# Patient Record
Sex: FEMALE | Race: WHITE | Employment: UNEMPLOYED | URBAN - METROPOLITAN AREA
[De-identification: names, ages, dates, MRNs, and addresses within clinical notes are randomized per-mention and may not be internally consistent; named-entity substitution may affect disease eponyms.]

---

## 2019-02-18 ENCOUNTER — MEDICAL CORRESPONDENCE (OUTPATIENT)
Dept: HEALTH INFORMATION MANAGEMENT | Facility: CLINIC | Age: 29
End: 2019-02-18

## 2019-02-18 ENCOUNTER — TRANSFERRED RECORDS (OUTPATIENT)
Dept: HEALTH INFORMATION MANAGEMENT | Facility: CLINIC | Age: 29
End: 2019-02-18

## 2019-02-19 ENCOUNTER — OFFICE VISIT (OUTPATIENT)
Dept: SURGERY | Facility: CLINIC | Age: 29
End: 2019-02-19
Payer: COMMERCIAL

## 2019-02-19 ENCOUNTER — TELEPHONE (OUTPATIENT)
Dept: SURGERY | Facility: CLINIC | Age: 29
End: 2019-02-19

## 2019-02-19 VITALS
TEMPERATURE: 98.1 F | DIASTOLIC BLOOD PRESSURE: 73 MMHG | OXYGEN SATURATION: 98 % | WEIGHT: 236 LBS | BODY MASS INDEX: 44.56 KG/M2 | HEIGHT: 61 IN | HEART RATE: 68 BPM | SYSTOLIC BLOOD PRESSURE: 125 MMHG

## 2019-02-19 DIAGNOSIS — E66.01 OBESITY, CLASS III, BMI 40-49.9 (MORBID OBESITY) (H): Primary | ICD-10-CM

## 2019-02-19 DIAGNOSIS — E66.01 OBESITY, CLASS III, BMI 40-49.9 (MORBID OBESITY) (H): ICD-10-CM

## 2019-02-19 LAB
ALBUMIN SERPL-MCNC: 3.5 G/DL (ref 3.4–5)
ALP SERPL-CCNC: 87 U/L (ref 40–150)
ALT SERPL W P-5'-P-CCNC: 18 U/L (ref 0–50)
ANION GAP SERPL CALCULATED.3IONS-SCNC: 6 MMOL/L (ref 3–14)
AST SERPL W P-5'-P-CCNC: 13 U/L (ref 0–45)
BILIRUB SERPL-MCNC: 0.3 MG/DL (ref 0.2–1.3)
BUN SERPL-MCNC: 13 MG/DL (ref 7–30)
CALCIUM SERPL-MCNC: 8.1 MG/DL (ref 8.5–10.1)
CHLORIDE SERPL-SCNC: 105 MMOL/L (ref 94–109)
CO2 SERPL-SCNC: 27 MMOL/L (ref 20–32)
CREAT SERPL-MCNC: 0.5 MG/DL (ref 0.52–1.04)
DEPRECATED CALCIDIOL+CALCIFEROL SERPL-MC: 20 UG/L (ref 20–75)
ERYTHROCYTE [DISTWIDTH] IN BLOOD BY AUTOMATED COUNT: 15.2 % (ref 10–15)
GFR SERPL CREATININE-BSD FRML MDRD: >90 ML/MIN/{1.73_M2}
GLUCOSE SERPL-MCNC: 83 MG/DL (ref 70–99)
HBA1C MFR BLD: 5.4 % (ref 0–5.6)
HCT VFR BLD AUTO: 41.4 % (ref 35–47)
HGB BLD-MCNC: 12.7 G/DL (ref 11.7–15.7)
MCH RBC QN AUTO: 24.2 PG (ref 26.5–33)
MCHC RBC AUTO-ENTMCNC: 30.7 G/DL (ref 31.5–36.5)
MCV RBC AUTO: 79 FL (ref 78–100)
PLATELET # BLD AUTO: 369 10E9/L (ref 150–450)
POTASSIUM SERPL-SCNC: 4 MMOL/L (ref 3.4–5.3)
PROT SERPL-MCNC: 7 G/DL (ref 6.8–8.8)
PTH-INTACT SERPL-MCNC: 94 PG/ML (ref 18–80)
RBC # BLD AUTO: 5.24 10E12/L (ref 3.8–5.2)
SODIUM SERPL-SCNC: 138 MMOL/L (ref 133–144)
WBC # BLD AUTO: 8.1 10E9/L (ref 4–11)

## 2019-02-19 RX ORDER — CITALOPRAM HYDROBROMIDE 20 MG/1
20 TABLET ORAL DAILY
COMMUNITY
End: 2019-02-22

## 2019-02-19 RX ORDER — CETIRIZINE HYDROCHLORIDE 10 MG/1
10 TABLET ORAL DAILY
COMMUNITY

## 2019-02-19 SDOH — HEALTH STABILITY: MENTAL HEALTH: HOW OFTEN DO YOU HAVE A DRINK CONTAINING ALCOHOL?: NEVER

## 2019-02-19 ASSESSMENT — PAIN SCALES - GENERAL: PAINLEVEL: NO PAIN (0)

## 2019-02-19 ASSESSMENT — MIFFLIN-ST. JEOR: SCORE: 1744.48

## 2019-02-19 NOTE — LETTER
"2/19/2019       RE: Myra Gordillo  [address]  [place] [address]     Dear Colleague,    Thank you for referring your patient, Myra Gordillo, to the [facility] SURGICAL WEIGHT MANAGEMENT at [facility]. Please see a copy of my visit note below.    New Bariatric Nutrition Consultation Note    Reason For Visit: Nutrition Assessment    Myra Gordillo is a 29 year old presenting today for new bariatric nutrition consult.  Pt is interested in laparoscopic sleeve gastrectomy with Dr. Escoto expected surgery in August 2019.  Patient is accompanied by self.  This is pt's first of 6 required nutrition visits prior to surgery. Pt referred by Anamaria Michael NP (2/19/19).  - Pt is also interested in medical weight management , she will continue with monthly RD visits for the next 6 months.     Support System Reviewed With Patient 2/18/2019   Who can you count on for support throughout your weight loss surgery journey? mother and sister       ANTHROPOMETRICS:  Estimated body mass index is 43.99 kg/m  as calculated from the following:    Height as of an earlier encounter on 2/19/19: 1.56 m (5' 1.42\").    Weight as of an earlier encounter on 2/19/19: 107 kg (236 lb).    Required weight loss goal pre-op: 10 lbs from initial consult weight (goal weight 226 lbs or less before surgery)       2/18/2019   I have tried the following methods to lose weight Watching portions or calories, Exercise, Atkins type diet (low carb/high protein), Physician directed program       Weight Loss Questions Reviewed With Patient 2/18/2019   How long have you been overweight? Since puberty       SUPPLEMENT INFORMATION:  None     NUTRITION HISTORY:  Recall Diet Questions Reviewed With Patient 2/18/2019   Describe what you typically consume for breakfast (typical or most recent): naam with hummus sometimes peaches    Describe what you typically consume for lunch (typical or most recent): cauliflower " rice with chicken and asparagus   Describe what you typically consume for supper (typical or most recent): baked potatoes, kale and salmon   Describe what you typically consume as snacks (typical or most recent): Peaches, frappachinos   How many ounces of water, or other low calorie drinks, do you drink daily (8 oz=1 glass)? 64 oz or more   Please indicate the type of milk: whole   How often do you drink alcohol? Never   Mostly snacking at night sometimes nothing and sometimes 3 snacks  1 once of week or less  No coffee in a week  Fruit milk or keily 350 ml whole.     Eating Habits 2/18/2019   Do you have any dietary restrictions? No   Do you currently binge eat (eat a large amount of food in a short time)? Yes   Are you an emotional eater? Yes   Do you get up to eat after falling asleep? No   What foods do you crave? frapuccinos, cannolis, ice cream       ADDITIONAL INFORMATION:  Pt's mother had bariatric surgery in the past and was successful.   Pt is in a master's program for food science.   Dining Out History Reviewed With Patient 2/18/2019   How often do you dine out? Rarely.   Where do you dine out? (select all that apply) sit-down restaurants   What types of food do you order when you dine out? alanna, seaweed salad       Physical Activity Reviewed With Patient 2/18/2019   How often do you exercise? 1 to 2 times per week   What is the duration of your exercise (in minutes)? 30 Minutes   What types of exercise do you do? walking   What keeps you from being more active?  I should be more active but I just have not gotten around to it, Shortness of breath, Worried people will look at me       NUTRITION DIAGNOSIS:  Obesity r/t long history of self-monitoring deficit and excessive energy intake aeb BMI >30 kg/m2.    INTERVENTION:  Intervention Provided/Education Provided on post-op diet guidelines, vitamins/minerals essential post-operatively, GI anatomy of bariatric surgeries, ways to help prepare for post-op  "diet guidelines pre-operatively, portion/calorie-control, mindful eating, source of protein.  Provided pt with list of goals RD contact information.      Questions Reviewed With Patient 2/18/2019   How ready are you to make changes regarding your weight? Number 1 = Not ready at all to make changes up to 10 = very ready. 10   How confident are you that you can change? 1 = Not confident that you will be successful making changes up to 10 = very confident. 8       Patient Understanding: good  Expected Compliance: good    GOALS:  Relating To Eating:  * Eat slowly (20-30 minutes per meal), chewing foods well (25 chews per bite/applesauce consistency) - stop eating as soon as you feel full  9\" Plate method (1/2 plate non-starchy vegetables/fruit, 1/4 plate lean protein, 1/4 plate whole grain starch - no more than 1/2 cup carb/meal)  Avoid snacking - Limit snacking, try exercising in the evening or distracting yourself. Try protein shake ( do not use whole milk)     Relating to beverages:  Reduce caffeine/carbonation/calorie containing beverages  Separate fluids from meals by 30 minutes before, during, and after eating    Relating to dietary supplements:  Start a multivitamin containing iron daily    Relating to activity:  Increase activity as able - try a home workout 3 times per week.     Relating to cravings:  Anytime you have a craving, find an activity (such as call a family member or friend, drink water, take a walk, read a book, play a game on phone) 15 minutes to see if craving goes away    Follow-Up:   Recommend 1 month follow up visits to assist with lifestyle changes or per insurance.      Time spent with patient: 30 minutes.  Lesley Carranza, MS, RD, LD       Again, thank you for allowing me to participate in the care of your patient.      Sincerely,    Lesley Carranza RD      "

## 2019-02-19 NOTE — LETTER
Date:February 21, 2019      Patient was self referred, no letter generated. Do not send.        [facility] Health Information

## 2019-02-19 NOTE — LETTER
"2019       RE: Myra Gordillo  [address]  [place] [address]     Dear Colleague,    Thank you for referring your patient, Myra Gordillo, to the [facility] Please see a copy of my visit note below.    New Bariatric Surgery Consultation Note    2019    RE: Myra Gordillo  MR#: [id number]  : 1990      Referring provider:       2019   Who referred you? Rose Mary Luna       Chief Complaint/Reason for visit: evaluation for possible weight loss surgery    Dear No primary care provider on file.,    I had the pleasure of seeing your patient, Myra Gordillo, to evaluate her obesity and consider her for possible weight loss surgery. As you know, Myra Gordillo is 29 year old.  She has a height of 5' 1.417\", a weight of 236 lbs 0 oz, and calculated Body mass index is 43.99 kg/m .      HISTORY OF PRESENT ILLNESS:  Weight Loss History Reviewed with Patient 2019   How long have you been overweight? Since puberty   What is the most that you have ever weighed? 237.8   What is the most weight you have lost? 40   I have tried the following methods to lose weight Watching portions or calories, Exercise, Atkins type diet (low carb/high protein), Physician directed program   I have tried the following weight loss medications? (Check all that apply) None   Have you ever had weight loss surgery? No     4 years ago lost from 210 to 175, counting calories and exercise  Low carb in the past  Keto thought about it, but she was concerned about high fat  OTC pills, Fausto  Dieticians  Had signed up with a study at the [facility] but didn't qualify (summer 2018)    CO-MORBIDITIES OF OBESITY INCLUDE:     2019   I have the following co-morbidities associated with obesity: High Cholesterol     High cholesterol not treated  Reflux- more with coffee, very occasional, tums PRN    PAST MEDICAL " HISTORY:  Past Medical History:   Diagnosis Date     Autism spectrum 2004     Degenerative arthritis of knee 2002     Generalized anxiety disorder      Neurofibromatosis, type 1 (H) 1991     Social anxiety disorder      No ongoing care for neurofibromatosis. Denies difficulties with wound healing.     PAST SURGICAL HISTORY:  History reviewed. No pertinent surgical history.    FAMILY HISTORY:   Family History   Problem Relation Age of Onset     Morbid Obesity Mother      Hypertension Father      Sarcoidosis Father      Neurofibromatosis Father      Obesity Father      Asthma Sister      Other - See Comments Sister      Obesity Sister      Mental Illness Maternal Grandmother      Mother had bariatric surgery a year ago and has been successful    SOCIAL HISTORY:   Social History Questions Reviewed With Patient 2/18/2019   Which best describes your employment status (select all that apply) I am a student   Which best describes your marital status: single   Do you have children? No   Who can you count on for support throughout your weight loss surgery journey? mother and sister   Can you afford 3 meals a day?  Yes   Can you afford 50-60 dollars a month for vitamins? 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   Sister in [place], mother in [place]. Family can come here to help with surgery if necessary      HABITS:     2/18/2019   How often do you drink alcohol? Never   Have you ever used any of the following nicotine products? No   Have you or are you currently using street drugs or prescription strength medication for which you do not have a prescription for? No   Do you have a history of chemical dependency (alcohol or drug abuse)? No       PSYCHOLOGICAL HISTORY:   Psychological History Reviewed With Patient 2/18/2019   Have you ever attempted suicide? Never.   Have you had thoughts of suicide in the past year? Yes   Have you ever been hospitalized for mental illness or a suicide attempt?  Never.   Do you have a history of chronic pain? No   Have you ever been diagnosed with fibromyalgia? No   Are you currently being treated for any of the following? (select all that apply) Depression, Anxiety   Are you currently seeing a therapist or counselor?  No   Are you currently seeing a psychiatrist? Yes     Suicidal thoughts NOT this year, many years ago  Feels like depression and anxiety are well controlled with PCP  Has psychiatrist, does not currently see therapist but psychiatrist meets with her monthly     ROS:     2/18/2019   Skin:  None of the above   HEENT: Missing teeth   If you answered yes to missing teeth, please indicate how many: 1   Musculoskeletal: Arthritis   Cardiovascular: Shortness of breath with activity   Pulmonary: Shortness of breath with activity   Gastrointestinal: Heartburn, Constipation   Genitourinary: None of the above   Hematological: None of the above   Neurological: None of the above   Female only: Regular menstrual cycles     Constipation very occasional     EATING BEHAVIORS:     2/18/2019   Have you or anyone else thought that you had an eating disorder? No   Do you currently binge eat (eat a large amount of food in a short time)? Yes   Are you an emotional eater? Yes   Do you get up to eat after falling asleep? No     Struggles with mindless eating and habitual eating, lots of craving, olga sweets  Some difficulty with hunger, especially when restricting calories to lose weight    EXERCISE:     2/18/2019   How often do you exercise? 1 to 2 times per week   What is the duration of your exercise (in minutes)? 30 Minutes   What types of exercise do you do? walking   What keeps you from being more active?  I should be more active but I just have not gotten around to it, Shortness of breath, Worried people will look at me       MEDICATIONS:  Current Outpatient Medications   Medication Sig Dispense Refill     cetirizine (ZYRTEC) 10 MG tablet Take 10 mg by mouth daily        "citalopram (CELEXA) 20 MG tablet Take 20 mg by mouth daily       melatonin 5 MG CAPS Take 1 tablet by mouth daily       Misc Natural Products (GLUCOSAMINE CHONDROITIN ADV PO) Take 1 tablet by mouth daily         ALLERGIES:  No Known Allergies    LABS/IMAGING/MEDICAL RECORDS REVIEW: unavailable    PHYSICAL EXAM:  /73 (BP Location: Left arm, Patient Position: Chair, Cuff Size: Adult Large)   Pulse 68   Temp 98.1  F (36.7  C) (Oral)   Ht 1.56 m (5' 1.42\")   Wt 107 kg (236 lb)   SpO2 98%   BMI 43.99 kg/m     General: No apparent distress  Neuro: A & O x 3  Head: Atraumatic, normocephalicI  Skin: warm and dry, no rashes on exposed skin  Respiratory: respirations unlabored  Abdomen: obese  Extremities: No LE swelling        In summary, Myra Gordillo has Class III obesity with a body mass index of Body mass index is 43.99 kg/m . kg/m2 and the comorbidities stated above. She completed an informational seminar and is a candidate for the laparoscopic gastric sleeve.  She will have to complete the following pre-requisites:    Received weight loss goal of 10 lb prior to surgery.  Achieve clearance from dietitian to see surgeon.  Have preoperative laboratory tests drawn.  Psychological Evaluation with MMPI and clearance for weight loss surgery.  Letter of clearance from the following psychiatry letter or support, primary care letter or support.    Today in the office we discussed gastric sleeve surgery. Preoperative, perioperative, and postoperative processes, management, and follow up were addressed.  Risks and benefits were outlined including the risk of death, staple line leak (1-2%), PE, DVT, ulcer, worsening GERD, N/V, stricture, hernia, wound infection, weight regain, and vitamin deficiencies. I emphasized exercise and activity along with appropriate food choice as the main foundation for weight loss with surgery providing surgical reinforcement of this.  All questions were answered.  A goal sheet and " support group handout were given to the patient.    Once the patient has completed the requirements in their task list and there are no further recommendations, the pt will be allowed to see the surgeon of her choice for consultation on the laparoscopic gastric sleeve surgery. Patient verbalizes understanding of the process to surgery and expectations for the postoperative period including the need for lifelong lifestyle changes, vitamin supplementation, and laboratory monitoring.     Patient is also interested in medical weight management and would like to persue this in addition to the surgical path. She is encouraged to stay on track with the monthly dietician visits. She would like to use medication as well as a tool to reach surgical weight loss goal of 10 lbs. Discussed medication options. Patient would like to try Qsymia. If not covered by insurance, would recommend topiramate only given history with anxiety. This is discussed with patient. Information provided.     Sincerely,     ARIAN Pisano CNP spent a total of 60 minutes face to face with the patient during today's office visit. Over 50% of this time was spent counseling the patient and/or coordinating care.    Bariatric Task List  Status:  Is patient a candidate for bariatric surgery?:  patient is a candidate for bariatric surgery -     Cleared to schedule surgeon consult?:    -     Status:    -     Surgeon: Dr. Escoto -     Tentative surgery month/year: Aug 2019 -        Insurance: Insurance:  [facility] -     Cigna: PCP Recommendation and Medical Clearance:    -      Referral:    -     Other:    -        Patient Info: Initial Weight:  236lb -     Date of Initial Weight/Height:  2/19/2019 -     Goal Weight (lbs):  226 -     Required Weight Loss:  10 -     Surgery Type:  sleeve gastrectomy -     Multidisciplinary Meeting:    -        Dietician Visits: Structured weight loss required by insurance?:  structured weight loss required -     Dietician  "Visit 1:  Needed -     Dietician Visit 2:  Needed -     Dietician Visit 3:  Needed -     Dietician Visit 4:  Needed -     Dietician Visit 5:  Needed -     Dietician Visit 6:  Needed -     Dietician Visit additional:    -     Clearance from dietician to see surgeon?:    -     Dietician Notes:    -        Psychological Evaluation: Psych eval:  Needed -     Therapist letter of support:    -     Psychiatrist letter of support:  Needed -        Lab Work: Complete Blood Count:  Needed -     Comprehensive Metabolic Panel:  Needed -     Vitamin D:  Needed -     Hgb A1c:  Needed -     PTH:  Needed -        Testing:    PCP: Establish care with PCP:  Completed -     Follow up with PCP:    -     PCP letter of support:  Needed -        Smoking: Quit tobacco use (3 months smoke free)?:    -     Quit date:    -        Patient Education:  Information Session:  Completed -     Given \"Making your decision\" handout?:  Given \"\"Making your decision\"\" handout? -     Given support group information?:  support group contact information provided -     Attended support group?:    -     Support plan in place?:    -     Research consents signed?:    -        Final Tasks:  Before surgery online class:  Needed -     Before surgery online class website link:  [email or web address]   After surgery online class:  Needed -     After surgery online class website link:  [email or web address]   Nurse visit for weigh-in and information:  Needed -     Pre-assessment clinic visit with anesthesia team for H&P:  Needed -     Final labs (Hgb, plt, T&S, UA):  Needed -        Notes:   -              Again, thank you for allowing me to participate in the care of your patient.      Sincerely,    Anamaria Michael NP      "

## 2019-02-19 NOTE — PATIENT INSTRUCTIONS
"GOALS:  Relating To Eating:  * Eat slowly (20-30 minutes per meal), chewing foods well (25 chews per bite/applesauce consistency) - stop eating as soon as you feel full  9\" Plate method (1/2 plate non-starchy vegetables/fruit, 1/4 plate lean protein, 1/4 plate whole grain starch - no more than 1/2 cup carb/meal)  Avoid snacking - Limit snacking, try exercising in the evening or distracting yourself. Try protein shake ( do not use whole milk)     Relating to beverages:  Reduce caffeine/carbonation/calorie containing beverages  Separate fluids from meals by 30 minutes before, during, and after eating    Relating to dietary supplements:  Start a multivitamin containing iron daily    Relating to activity:  Increase activity as able - try a home workout 3 times per week.     Relating to cravings:  Anytime you have a craving, find an activity (such as call a family member or friend, drink water, take a walk, read a book, play a game on phone) 15 minutes to see if craving goes away      Follow up with RD in one month    Lesley Carranza, MS, RD, LD  If you need to schedule or reschedule with a dietitian please call [phone].  "

## 2019-02-19 NOTE — TELEPHONE ENCOUNTER
"Prior Authorization Retail Medication Request    Medication/Dose: Qsymia  ICD code (if different than what is on RX):  Obesity, Class III, BMI 40-49.9 (morbid obesity) (H) [E66.01]  Previously Tried and Failed:  Watching portions or calories, Exercise, Atkins type diet (low carb/high protein), Physician directed program  Rationale:  Myra Gordillo is 29 year old.  She has a height of 5' 1.417\", a weight of 236 lbs 0 oz, and calculated Body mass index is 43.99 kg/m  and the following co-morbidities: high cholesterol.      Insurance Name:    Insurance ID:        Pharmacy Information (if different than what is on RX)  Name:  [facility] [place], [place] - [address] [place] [address] [id number]  Phone:  [phone]  "

## 2019-02-19 NOTE — LETTER
Date:February 20, 2019      Patient was self referred, no letter generated. Do not send.        [facility] Health Information

## 2019-02-19 NOTE — NURSING NOTE
"(   Chief Complaint   Patient presents with     Consult     NBS    )    ( Weight: 107 kg (236 lb) )  ( Height: 156 cm (5' 1.42\") )  ( BMI (Calculated): 44.08 )  ( Initial Weight: 107 kg (236 lb) )  ( Cumulative weight loss (lbs): 0 )  (   )  (   )  ( Waist Circumference (cm): 127.4 cm )  (   )    ( BP: 125/73 )  (   )  ( Temp: 98.1  F (36.7  C) )  ( Temp src: Oral )  ( Pulse: 68 )  (   )  ( SpO2: 98 % )    ( There is no problem list on file for this patient.   )  (   Current Outpatient Medications   Medication Sig Dispense Refill     cetirizine (ZYRTEC) 10 MG tablet Take 10 mg by mouth daily       citalopram (CELEXA) 20 MG tablet Take 20 mg by mouth daily       Misc Natural Products (GLUCOSAMINE CHONDROITIN ADV PO) Take 1 tablet by mouth daily      )  ( Diabetes Eval:    )    ( Pain Eval:  No Pain (0) )    ( Wound Eval:       )    (   History   Smoking Status     Never Smoker   Smokeless Tobacco     Never Used    )    ( Signed By:  Amanda Cervantes; February 19, 2019; 9:11 AM )    "

## 2019-02-19 NOTE — PROGRESS NOTES
"New Bariatric Surgery Consultation Note    2019    RE: Myra Gordillo  MR#: [id number]  : 1990      Referring provider:       2019   Who referred you? Rose Mary Luna       Chief Complaint/Reason for visit: evaluation for possible weight loss surgery    Dear No primary care provider on file.,    I had the pleasure of seeing your patient, Myra Gordillo, to evaluate her obesity and consider her for possible weight loss surgery. As you know, Myra Gordillo is 29 year old.  She has a height of 5' 1.417\", a weight of 236 lbs 0 oz, and calculated Body mass index is 43.99 kg/m .      HISTORY OF PRESENT ILLNESS:  Weight Loss History Reviewed with Patient 2019   How long have you been overweight? Since puberty   What is the most that you have ever weighed? 237.8   What is the most weight you have lost? 40   I have tried the following methods to lose weight Watching portions or calories, Exercise, Atkins type diet (low carb/high protein), Physician directed program   I have tried the following weight loss medications? (Check all that apply) None   Have you ever had weight loss surgery? No     4 years ago lost from 210 to 175, counting calories and exercise  Low carb in the past  Keto thought about it, but she was concerned about high fat  OTC pills, Fausto  Dieticians  Had signed up with a study at the [facility] but didn't qualify (summer 2018)    CO-MORBIDITIES OF OBESITY INCLUDE:     2019   I have the following co-morbidities associated with obesity: High Cholesterol     High cholesterol not treated  Reflux- more with coffee, very occasional, tums PRN    PAST MEDICAL HISTORY:  Past Medical History:   Diagnosis Date     Autism spectrum 2004     Degenerative arthritis of knee      Generalized anxiety disorder      Neurofibromatosis, type 1 (H) 1991     Social anxiety disorder      No ongoing care for neurofibromatosis. Denies difficulties with wound healing.     PAST " SURGICAL HISTORY:  History reviewed. No pertinent surgical history.    FAMILY HISTORY:   Family History   Problem Relation Age of Onset     Morbid Obesity Mother      Hypertension Father      Sarcoidosis Father      Neurofibromatosis Father      Obesity Father      Asthma Sister      Other - See Comments Sister      Obesity Sister      Mental Illness Maternal Grandmother      Mother had bariatric surgery a year ago and has been successful    SOCIAL HISTORY:   Social History Questions Reviewed With Patient 2/18/2019   Which best describes your employment status (select all that apply) I am a student   Which best describes your marital status: single   Do you have children? No   Who can you count on for support throughout your weight loss surgery journey? mother and sister   Can you afford 3 meals a day?  Yes   Can you afford 50-60 dollars a month for vitamins? 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   Sister in [place], mother in [place]. Family can come here to help with surgery if necessary      HABITS:     2/18/2019   How often do you drink alcohol? Never   Have you ever used any of the following nicotine products? No   Have you or are you currently using street drugs or prescription strength medication for which you do not have a prescription for? No   Do you have a history of chemical dependency (alcohol or drug abuse)? No       PSYCHOLOGICAL HISTORY:   Psychological History Reviewed With Patient 2/18/2019   Have you ever attempted suicide? Never.   Have you had thoughts of suicide in the past year? Yes   Have you ever been hospitalized for mental illness or a suicide attempt? Never.   Do you have a history of chronic pain? No   Have you ever been diagnosed with fibromyalgia? No   Are you currently being treated for any of the following? (select all that apply) Depression, Anxiety   Are you currently seeing a therapist or counselor?  No   Are you currently seeing a psychiatrist?  Yes     Suicidal thoughts NOT this year, many years ago  Feels like depression and anxiety are well controlled with PCP  Has psychiatrist, does not currently see therapist but psychiatrist meets with her monthly     ROS:     2/18/2019   Skin:  None of the above   HEENT: Missing teeth   If you answered yes to missing teeth, please indicate how many: 1   Musculoskeletal: Arthritis   Cardiovascular: Shortness of breath with activity   Pulmonary: Shortness of breath with activity   Gastrointestinal: Heartburn, Constipation   Genitourinary: None of the above   Hematological: None of the above   Neurological: None of the above   Female only: Regular menstrual cycles     Constipation very occasional     EATING BEHAVIORS:     2/18/2019   Have you or anyone else thought that you had an eating disorder? No   Do you currently binge eat (eat a large amount of food in a short time)? Yes   Are you an emotional eater? Yes   Do you get up to eat after falling asleep? No     Struggles with mindless eating and habitual eating, lots of craving, olga sweets  Some difficulty with hunger, especially when restricting calories to lose weight    EXERCISE:     2/18/2019   How often do you exercise? 1 to 2 times per week   What is the duration of your exercise (in minutes)? 30 Minutes   What types of exercise do you do? walking   What keeps you from being more active?  I should be more active but I just have not gotten around to it, Shortness of breath, Worried people will look at me       MEDICATIONS:  Current Outpatient Medications   Medication Sig Dispense Refill     cetirizine (ZYRTEC) 10 MG tablet Take 10 mg by mouth daily       citalopram (CELEXA) 20 MG tablet Take 20 mg by mouth daily       melatonin 5 MG CAPS Take 1 tablet by mouth daily       Misc Natural Products (GLUCOSAMINE CHONDROITIN ADV PO) Take 1 tablet by mouth daily         ALLERGIES:  No Known Allergies    LABS/IMAGING/MEDICAL RECORDS REVIEW: unavailable    PHYSICAL  "EXAM:  /73 (BP Location: Left arm, Patient Position: Chair, Cuff Size: Adult Large)   Pulse 68   Temp 98.1  F (36.7  C) (Oral)   Ht 1.56 m (5' 1.42\")   Wt 107 kg (236 lb)   SpO2 98%   BMI 43.99 kg/m    General: No apparent distress  Neuro: A & O x 3  Head: Atraumatic, normocephalicI  Skin: warm and dry, no rashes on exposed skin  Respiratory: respirations unlabored  Abdomen: obese  Extremities: No LE swelling        In summary, Myra Gordillo has Class III obesity with a body mass index of Body mass index is 43.99 kg/m . kg/m2 and the comorbidities stated above. She completed an informational seminar and is a candidate for the laparoscopic gastric sleeve.  She will have to complete the following pre-requisites:    Received weight loss goal of 10 lb prior to surgery.  Achieve clearance from dietitian to see surgeon.  Have preoperative laboratory tests drawn.  Psychological Evaluation with MMPI and clearance for weight loss surgery.  Letter of clearance from the following psychiatry letter or support, primary care letter or support.    Today in the office we discussed gastric sleeve surgery. Preoperative, perioperative, and postoperative processes, management, and follow up were addressed.  Risks and benefits were outlined including the risk of death, staple line leak (1-2%), PE, DVT, ulcer, worsening GERD, N/V, stricture, hernia, wound infection, weight regain, and vitamin deficiencies. I emphasized exercise and activity along with appropriate food choice as the main foundation for weight loss with surgery providing surgical reinforcement of this.  All questions were answered.  A goal sheet and support group handout were given to the patient.    Once the patient has completed the requirements in their task list and there are no further recommendations, the pt will be allowed to see the surgeon of her choice for consultation on the laparoscopic gastric sleeve surgery. Patient verbalizes understanding " of the process to surgery and expectations for the postoperative period including the need for lifelong lifestyle changes, vitamin supplementation, and laboratory monitoring.     Patient is also interested in medical weight management and would like to persue this in addition to the surgical path. She is encouraged to stay on track with the monthly dietician visits. She would like to use medication as well as a tool to reach surgical weight loss goal of 10 lbs. Discussed medication options. Patient would like to try Qsymia. If not covered by insurance, would recommend topiramate only given history with anxiety. This is discussed with patient. Information provided.     Sincerely,     ARIAN Pisano CNP     I spent a total of 60 minutes face to face with the patient during today's office visit. Over 50% of this time was spent counseling the patient and/or coordinating care.    Bariatric Task List  Status:  Is patient a candidate for bariatric surgery?:  patient is a candidate for bariatric surgery -     Cleared to schedule surgeon consult?:    -     Status:    -     Surgeon: Dr. Escoto -     Tentative surgery month/year: Aug 2019 -        Insurance: Insurance:  [facility] -     Cigna: PCP Recommendation and Medical Clearance:    -     HP Referral:    -     Other:    -        Patient Info: Initial Weight:  236lb -     Date of Initial Weight/Height:  2/19/2019 -     Goal Weight (lbs):  226 -     Required Weight Loss:  10 -     Surgery Type:  sleeve gastrectomy -     Multidisciplinary Meeting:    -        Dietician Visits: Structured weight loss required by insurance?:  structured weight loss required -     Dietician Visit 1:  Needed -     Dietician Visit 2:  Needed -     Dietician Visit 3:  Needed -     Dietician Visit 4:  Needed -     Dietician Visit 5:  Needed -     Dietician Visit 6:  Needed -     Dietician Visit additional:    -     Clearance from dietician to see surgeon?:    -     Dietician Notes:    -       "  Psychological Evaluation: Psych eval:  Needed -     Therapist letter of support:    -     Psychiatrist letter of support:  Needed -        Lab Work: Complete Blood Count:  Needed -     Comprehensive Metabolic Panel:  Needed -     Vitamin D:  Needed -     Hgb A1c:  Needed -     PTH:  Needed -        Testing:    PCP: Establish care with PCP:  Completed -     Follow up with PCP:    -     PCP letter of support:  Needed -        Smoking: Quit tobacco use (3 months smoke free)?:    -     Quit date:    -        Patient Education:  Information Session:  Completed -     Given \"Making your decision\" handout?:  Given \"\"Making your decision\"\" handout? -     Given support group information?:  support group contact information provided -     Attended support group?:    -     Support plan in place?:    -     Research consents signed?:    -        Final Tasks:  Before surgery online class:  Needed -     Before surgery online class website link:  [email or web address]   After surgery online class:  Needed -     After surgery online class website link:  [email or web address]   Nurse visit for weigh-in and information:  Needed -     Pre-assessment clinic visit with anesthesia team for H&P:  Needed -     Final labs (Hgb, plt, T&S, UA):  Needed -        Notes:   -          "

## 2019-02-19 NOTE — PROGRESS NOTES
"New Bariatric Nutrition Consultation Note    Reason For Visit: Nutrition Assessment    Myra Gordillo is a 29 year old presenting today for new bariatric nutrition consult.  Pt is interested in laparoscopic sleeve gastrectomy with Dr. Escoto expected surgery in August 2019.  Patient is accompanied by self.  This is pt's first of 6 required nutrition visits prior to surgery. Pt referred by Anamaria Michael NP (2/19/19).  - Pt is also interested in medical weight management , she will continue with monthly RD visits for the next 6 months.     Support System Reviewed With Patient 2/18/2019   Who can you count on for support throughout your weight loss surgery journey? mother and sister       ANTHROPOMETRICS:  Estimated body mass index is 43.99 kg/m  as calculated from the following:    Height as of an earlier encounter on 2/19/19: 1.56 m (5' 1.42\").    Weight as of an earlier encounter on 2/19/19: 107 kg (236 lb).    Required weight loss goal pre-op: 10 lbs from initial consult weight (goal weight 226 lbs or less before surgery)       2/18/2019   I have tried the following methods to lose weight Watching portions or calories, Exercise, Atkins type diet (low carb/high protein), Physician directed program       Weight Loss Questions Reviewed With Patient 2/18/2019   How long have you been overweight? Since puberty       SUPPLEMENT INFORMATION:  None     NUTRITION HISTORY:  Recall Diet Questions Reviewed With Patient 2/18/2019   Describe what you typically consume for breakfast (typical or most recent): naam with hummus sometimes peaches    Describe what you typically consume for lunch (typical or most recent): cauliflower rice with chicken and asparagus   Describe what you typically consume for supper (typical or most recent): baked potatoes, kale and salmon   Describe what you typically consume as snacks (typical or most recent): Peaches, frappachinos   How many ounces of water, or other low calorie drinks, do you drink " daily (8 oz=1 glass)? 64 oz or more   Please indicate the type of milk: whole   How often do you drink alcohol? Never   Mostly snacking at night sometimes nothing and sometimes 3 snacks  1 once of week or less  No coffee in a week  Fruit milk or keily 350 ml whole.     Eating Habits 2/18/2019   Do you have any dietary restrictions? No   Do you currently binge eat (eat a large amount of food in a short time)? Yes   Are you an emotional eater? Yes   Do you get up to eat after falling asleep? No   What foods do you crave? frapuccinos, cannolis, ice cream       ADDITIONAL INFORMATION:  Pt's mother had bariatric surgery in the past and was successful.   Pt is in a master's program for food science.   Dining Out History Reviewed With Patient 2/18/2019   How often do you dine out? Rarely.   Where do you dine out? (select all that apply) sit-down restaurants   What types of food do you order when you dine out? sushi, seaweed salad       Physical Activity Reviewed With Patient 2/18/2019   How often do you exercise? 1 to 2 times per week   What is the duration of your exercise (in minutes)? 30 Minutes   What types of exercise do you do? walking   What keeps you from being more active?  I should be more active but I just have not gotten around to it, Shortness of breath, Worried people will look at me       NUTRITION DIAGNOSIS:  Obesity r/t long history of self-monitoring deficit and excessive energy intake aeb BMI >30 kg/m2.    INTERVENTION:  Intervention Provided/Education Provided on post-op diet guidelines, vitamins/minerals essential post-operatively, GI anatomy of bariatric surgeries, ways to help prepare for post-op diet guidelines pre-operatively, portion/calorie-control, mindful eating, source of protein.  Provided pt with list of goals RD contact information.      Questions Reviewed With Patient 2/18/2019   How ready are you to make changes regarding your weight? Number 1 = Not ready at all to make changes up to 10  "= very ready. 10   How confident are you that you can change? 1 = Not confident that you will be successful making changes up to 10 = very confident. 8       Patient Understanding: good  Expected Compliance: good    GOALS:  Relating To Eating:  * Eat slowly (20-30 minutes per meal), chewing foods well (25 chews per bite/applesauce consistency) - stop eating as soon as you feel full  9\" Plate method (1/2 plate non-starchy vegetables/fruit, 1/4 plate lean protein, 1/4 plate whole grain starch - no more than 1/2 cup carb/meal)  Avoid snacking - Limit snacking, try exercising in the evening or distracting yourself. Try protein shake ( do not use whole milk)     Relating to beverages:  Reduce caffeine/carbonation/calorie containing beverages  Separate fluids from meals by 30 minutes before, during, and after eating    Relating to dietary supplements:  Start a multivitamin containing iron daily    Relating to activity:  Increase activity as able - try a home workout 3 times per week.     Relating to cravings:  Anytime you have a craving, find an activity (such as call a family member or friend, drink water, take a walk, read a book, play a game on phone) 15 minutes to see if craving goes away    Follow-Up:   Recommend 1 month follow up visits to assist with lifestyle changes or per insurance.      Time spent with patient: 30 minutes.  Lesley Carranza, MS, RD, LD     "

## 2019-02-19 NOTE — PATIENT INSTRUCTIONS
It was a pleasure meeting with you today.     Thank you for allowing us the privilege of caring for you. We hope we provided you with the excellent service you deserve.     Please let us know if there is anything else we can do for you so that we can be sure you are leaving completely satisfied with your care experience.      You saw ARIAN Pisano CNP  today.     Instructions per today's visit:   Labs today  Start Qsymia  Schedule psych consult  Follow up with primary care and psychiatrist for letters of support  Dietician today and monthly until surgery      Medications started today: Qsymia      To schedule appointments with our team, please call [phone] option #1    Please call during clinic hours Monday through Friday 8:00a - 4:00p if you have questions or you can contact us via Snagsta at anytime.      Nurses: [phone]  Fax: [phone]  Surgery Scheduler: [phone]    Please call the hospital at [phone] to speak with our on call MDs if you have urgent needs after hours, during weekends, or holidays.    **Please note if you need to go to the Emergency Room for any reason in the first 90 days after surgery it is important to go to the [facility] ER on the [place] on [facility] off of the [address] exit off [address]    *For patients who are currently enrolled in our program and have not yet had weight loss surgery please note*:    You must be at your required goal weight at the time of your Anesthesia clinic visit as well as the day of surgery or your surgery will be canceled. You will not be able to obtain a new surgery date until you have met your goal weight.    Lab results will be communicated through My Chart or letter (if My Chart not used). Please call the clinic if you have not received communication after 1 week or if you have any questions.?      Weight loss medications before and after surgery protocol:    Qsymia (phentermine/topiramate): Hold morning of surgery. Restart  after surgery post op day #1    Main follow-up parameters for all bariatric patients     Patients who have undergone Bariatric surgery:    1. Follow-up interval during the first year: ~1 week, 1 month, 3 month, 6 month,12 months.  Every 6 months until 5 years; and then annually thereafter.  The goal of follow-up sessions is to ensure that food intake behavior (choice of food and eating speed) and exercise/activity level are set appropriately.    2. Yearly lab tests ordered by our clinic.      3. The bariatric team should be aware and potentially evaluate all adverse gastrointestinal symptoms (dysphagia, abdominal pain, nausea, vomiting, diarrhea, heartburn, reflux, etc), which can be a sign of complication.  The bariatric surgeons perform general surgery procedures in addition to bariatric surgery (laparoscopic cholecystectomy, etc.)    4. Inability to tolerate textured food (chicken, steak, fish, eggs, beans) is NOT normal and may need to be evaluated by endoscopy performed by the bariatric surgeon.                   Bariatric Task List  Status:  Is patient a candidate for bariatric surgery?:  patient is a candidate for bariatric surgery -     Cleared to schedule surgeon consult?:    -     Status:    -     Surgeon: Dr. Escoto -     Tentative surgery month/year: Aug 2019 -        Insurance: Insurance:  [facility] -     Cigna: PCP Recommendation and Medical Clearance:    -      Referral:    -     Other:    -        Patient Info: Initial Weight:  236lb -     Date of Initial Weight/Height:  2/19/2019 -     Goal Weight (lbs):  226 -     Required Weight Loss:  10 -     Surgery Type:  sleeve gastrectomy -     Multidisciplinary Meeting:    -        Dietician Visits: Structured weight loss required by insurance?:  structured weight loss required -     Dietician Visit 1:  Needed -     Dietician Visit 2:  Needed -     Dietician Visit 3:  Needed -     Dietician Visit 4:  Needed -     Dietician Visit 5:  Needed -     Dietician  "Visit 6:  Needed -     Dietician Visit additional:    -     Clearance from dietician to see surgeon?:    -     Dietician Notes:    -        Psychological Evaluation: Psych eval:  Needed -     Therapist letter of support:    -     Psychiatrist letter of support:  Needed -        Lab Work: Complete Blood Count:  Needed -     Comprehensive Metabolic Panel:  Needed -     Vitamin D:  Needed -     Hgb A1c:  Needed -     PTH:  Needed -        Testing:    PCP: Establish care with PCP:  Completed -     Follow up with PCP:    -     PCP letter of support:  Needed -        Smoking: Quit tobacco use (3 months smoke free)?:    -     Quit date:    -        Patient Education:  Information Session:  Completed -     Given \"Making your decision\" handout?:  Given \"\"Making your decision\"\" handout? -     Given support group information?:  support group contact information provided -     Attended support group?:    -     Support plan in place?:    -     Research consents signed?:    -        Final Tasks:  Before surgery online class:  Needed -     Before surgery online class website link:  [email or web address]   After surgery online class:  Needed -     After surgery online class website link:  [email or web address]   Nurse visit for weigh-in and information:  Needed -     Pre-assessment clinic visit with anesthesia team for H&P:  Needed -     Final labs (Hgb, plt, T&S, UA):  Needed -        Notes:   -       MEDICATION STARTED AT THIS APPOINTMENT    We are starting Qsymia. This is a specific obesity medication and is a combination of Phentermine and Topiramate, formulated as a sustained release, taken one time a day. There are a few different doses of the medication. Doses come in 3.75/23 mg (usually skipped), 7.5/46 mg, 11.25/69 mg, and 15/92 mg. Call the nurse at [phone] if you have any questions or concerns. (Do not stop taking it if you don't think it's working. For some people it works even " though they do not feel much different.)    For some of our patients, the pills work right away. They feel and think quite differently about food. Other patients don't feel much of a change but find in fact they have lost weight! Like all weight loss medications, Qsymia works best when you help it work.  This means:    1) Have less tempting high calorie (fattening) food around the house or office    2) Have lower calorie food (fruits, vegetables,low fat meats and dairy) for snacks    3) Eat out only one time or less each week.   4) Eat your meals at a table with the TV or computer off.    Side-effects (generally well tolerated because it is a sustained release medication)    Topiramate:   -Tingling in hands,feet, or face (usually not very troublesome)   -Mental confusion and word finding trouble (about 10% of patients have this.)     -Feeling sleepy or a bit dopey- this goes away very soon after starting.      Phentermine:    -Feelings of racing pulse or rapid heart beat.    -Increased anxiety.   -Some people can get an elevated blood pressure. Because of this we may have  you  come back within a week or so of starting the medication for a blood pressure check.    One of the dangers of topiramate is the possibility of birth defects--if you get pregnant when you are on it, there is the risk that your baby will be born with a cleft lip or palate.  If you are on topiramate and of child bearing age, you need to be on a reliable form of birth control or refrain from sexual intercourse.     It is very rare for insurance to pay for this and it typically costs around $200 per month. Please check out the website [email or web address] and sign up for the Pharmacy savings program to save $75 a month for 12 months if eligible. The medication can also be paid for out of pocket. It may require a prior authorization which could take up to 1-2 weeks.      In order to get refills of this or any medication we prescribe you must be seen in  the medical weight mgmt clinic every 2-4 months. Please have your pharmacy fax a refill request to [phone].

## 2019-02-20 ENCOUNTER — MYC MEDICAL ADVICE (OUTPATIENT)
Dept: ENDOCRINOLOGY | Facility: CLINIC | Age: 29
End: 2019-02-20

## 2019-02-22 RX ORDER — BUPROPION HYDROCHLORIDE 300 MG/1
300 TABLET ORAL EVERY MORNING
COMMUNITY

## 2019-02-22 NOTE — TELEPHONE ENCOUNTER
Central Prior Authorization Team   Phone: [phone]      PA Initiation    Medication: Qsymia-PA initiated  Insurance Company: [employer] - Phone [phone] Fax [phone]  Pharmacy Filling the Rx: [facility] [place], [place] - [address] [place] [address] [id number]  Filling Pharmacy Phone: [phone]  Filling Pharmacy Fax:    Start Date: 2/22/2019

## 2019-02-26 NOTE — TELEPHONE ENCOUNTER
Prior Authorization Approval    Authorization Effective Date:    Authorization Expiration Date:    Medication: Qsymia-PA approved  Approved Dose/Quantity: 2/day  Reference #:     Insurance Company: [employer] - Phone [phone] Fax [phone]  Expected CoPay:       CoPay Card Available:      Foundation Assistance Needed:    Which Pharmacy is filling the prescription (Not needed for infusion/clinic administered): [facility] [address] [id number]  Pharmacy Notified: Yes  Patient Notified: No    Approved at higher copay, no tiering exception allowed.

## 2019-02-28 ENCOUNTER — CARE COORDINATION (OUTPATIENT)
Dept: SURGERY | Facility: CLINIC | Age: 29
End: 2019-02-28

## 2019-02-28 NOTE — PROGRESS NOTES
Tasklist updated.    Bariatric Task List  Status:  Is patient a candidate for bariatric surgery?:  patient is a candidate for bariatric surgery -     Cleared to schedule surgeon consult?:    -     Status:  surgery evaluation in process -     Surgeon: Dr. Escoto -     Tentative surgery month/year: May 2019 -        Insurance: Insurance:  [employer] -      Referral:    -  Needed. To do 5 coaching phone calls with [facility] [employer] at [facility]. Every 10 - 14 days.  [facility] will register you to receive the first call.   Other:    -        Patient Info: Initial Weight:  236lb -     Date of Initial Weight/Height:  2/19/2019 -     Goal Weight (lbs):  226 -     Required Weight Loss:  10 -     Surgery Type:  sleeve gastrectomy -        Dietician Visits: Structured weight loss required by insurance?:  structured weight loss required -     Dietician Visit 1:  Completed - February   Dietician Visit 2:  Needed - March appt   Dietician Visit 3:  Needed - April. Call [facility] at [phone] to schedule same day as visit with surgeon.   Dietician Visit 4:    -     Dietician Visit 5:    -     Dietician Visit 6:    -     Dietician Visit additional:    -  Can meet with the dietician monthly until you are at your goal weight. Call [phone] to schedule.   Clearance from dietician to see surgeon?:    -     Dietician Notes:    -        Psychological Evaluation: Psych eval:  Needed - Call [employer] member services number to get names of [employer] Providers who do the psych evals.   Psychiatrist letter of support:  Needed - Have office fax it to: [phone].      Lab Work: Complete Blood Count:  Completed - 2/19/19   Comprehensive Metabolic Panel:  Completed -     Vitamin D:  Completed -     Hgb A1c:  Completed -     PTH:  Completed -        Consults/ Clearance: Medical Weight Management: Completed - Medication prescribed.      PCP: Establish care with PCP:  Completed -     Follow up with PCP:    -     PCP letter of support:   "Needed - Have it faxed to [phone].      Patient Education:  Information Session:  Completed -     Given \"Making your decision\" handout?:  Given \"\"Making your decision\"\" handout? -     Given support group information?:  support group contact information provided -     Attended support group?:    -     Support plan in place?:    -     Research consents signed?:    -        Final Tasks:  Before surgery online class:  Needed -     Before surgery online class website link:  [email or web address]   After surgery online class:  Needed -     After surgery online class website link:  [email or web address]   Nurse visit for weigh-in and information:  Needed -     Pre-assessment clinic visit with anesthesia team for H&P:  Needed -     Final labs (Hgb, plt, T&S, UA):  Needed -        Notes:   -           "

## 2019-03-09 ENCOUNTER — HEALTH MAINTENANCE LETTER (OUTPATIENT)
Age: 29
End: 2019-03-09

## 2019-03-19 ENCOUNTER — OFFICE VISIT (OUTPATIENT)
Dept: SURGERY | Facility: CLINIC | Age: 29
End: 2019-03-19
Payer: COMMERCIAL

## 2019-03-19 ENCOUNTER — TELEPHONE (OUTPATIENT)
Dept: SURGERY | Facility: CLINIC | Age: 29
End: 2019-03-19

## 2019-03-19 NOTE — PATIENT INSTRUCTIONS
"GOALS:  Relating To Eating:  * Eat slowly (20-30 minutes per meal), chewing foods well (25 chews per bite/applesauce consistency) - stop eating as soon as you feel full  9\" Plate method (1/2 plate non-starchy vegetables/fruit, 1/4 plate lean protein, 1/4 plate whole grain starch - no more than 1/2 cup carb/meal)  Avoid snacking - Limit snacking, try exercising in the evening or distracting yourself. Try protein shake ( do not use whole milk)     Relating to beverages:  Reduce caffeine/carbonation/calorie containing beverages  Separate fluids from meals by 30 minutes before, during, and after eating    Relating to dietary supplements:  Continue a multivitamin containing iron daily  Start vitamin D3 5,000 international unit(s)     Relating to activity:  Increase activity as able - Walk for 15 minutes 2x daily or reach 8,000 steps    Relating to cravings:  Anytime you have a craving, find an activity (such as call a family member or friend, drink water, take a walk, read a book, play a game on phone) 15 minutes to see if craving goes away     *Protein Shake Criteria: no more than 250 Calories, at least 20 grams of protein, and less than 10 grams of sugar.     CONTACT INFORMATION    **Call [facility] at [phone]:   -If questions about insurance and the prior authorization process.   -After meeting the surgeon to get a surgery date, schedule the  appointments with the anesthesia team and the first postop appointments.  - Ask about bariatric evaluation (Dr. Siegel,) can I get clearance from her.   -- If we need our health psychologist schedule with Abby Jordan or Tiana Nichols    - When you have your next appointment with your Psychiatrist ask for a letter of support. She can fax the letter of support to [phone].     - Ask Dr. Luna for a letter of support for bariatric surgery. She can fax the letter of support to [phone].    Follow up with RD in one month    Lesley Carranza, MS, RD, LD   If you need to " schedule or reschedule with a dietitian please call [phone].

## 2019-03-19 NOTE — TELEPHONE ENCOUNTER
Myra called me to get a surgery date and an appointment with Dr. Escoto. She would like a May date. I made an appointment with Dr. Escoto on April 18 when she was coming in the see Lesley NATH. I asked if she had scheduled her psychological evaluation and she said she wanted to know if her psychiatrist could do that. She will be seeing her next week and I asked her to bring the letter that was in the packet she was given when she came in for her consult. I told her tht normally we would ask for a letter of support from the psychiatrist but they did not normally do the psychological evaluation. She will check into that and call me if her psychiatrist would not do the evaluation, if not, I would have to reschedule her appointment with Dr. Escoto until after the evaluation was received. She wanted a PAC date and OR date now but I told her I could do that after the periop worksheet and PAC order was entered.

## 2019-03-19 NOTE — LETTER
"3/19/2019       RE: Myra Gordillo  [address]  [place] [address]     Dear Colleague,    Thank you for referring your patient, Myra Gordillo, to the [facility] at [facility]. Please see a copy of my visit note below.    Return Bariatric Nutrition Consultation Note    Reason For Visit: Nutrition Assessment    Myra Gordillo is a 29 year old presenting today for follow-up bariatric nutrition consult.  Pt is interested in laparoscopic sleeve gastrectomy with Dr. Escoto expected surgery in August 2019.  Patient is accompanied by self.  This is pt's second of 6 required nutrition visits prior to surgery. Pt referred by Anamaria Michael NP (2/19/19).  - Pt was previously interested in medical weight management and thinking about surgery. She has recently decided to pursue surgery. Her mom has had bariatric surgery in the past and will be coming to [place] for support post-op.      Support System Reviewed With Patient 2/18/2019   Who can you count on for support throughout your weight loss surgery journey? mother and sister       ANTHROPOMETRICS:  Estimated body mass index is 43.99 kg/m  as calculated from the following:    Height as of 2/19/19: 1.56 m (5' 1.42\").    Initial Weight as of 2/19/19: 107 kg (236 lb).    Current Weight: 3/19/19: 224.6 (-11 lb from last month/initial visit)     Required weight loss goal pre-op: 10 lbs from initial consult weight (goal weight 226 lbs or less before surgery)       2/18/2019   I have tried the following methods to lose weight Watching portions or calories, Exercise, Atkins type diet (low carb/high protein), Physician directed program       Weight Loss Questions Reviewed With Patient 2/18/2019   How long have you been overweight? Since puberty       SUPPLEMENT INFORMATION:  Multivitamin - one a day with iron and vitamin D     NUTRITION HISTORY:  Patient has a food journal and she has created 8 " "healthy life style goals that she goes through everyday. She reports that some mornings she has only been having a protein smoothie ( 1/4 c fruit, protein powder, milk or water or Quest protein with milk in  with caffiene free coffee for breakfast). She is snacking less throughout the day, but will still snack at night, up to 3 small snacks in the evening (Outshine popsicle).     Personal Health goals:  No starbucks   8,000 steps a day  No fast food   No coffee  Walk 15 minutes 2x daily    Progress Towards Previous Goals:   Relating To Eating:  * Eat slowly (20-30 minutes per meal), chewing foods well (25 chews per bite/applesauce consistency) - stop eating as soon as you feel full-continue   9\" Plate method (1/2 plate non-starchy vegetables/fruit, 1/4 plate lean protein, 1/4 plate whole grain starch - no more than 1/2 cup carb/meal)-Met  Avoid snacking - Limit snacking, try exercising in the evening or distracting yourself. Try protein shake ( do not use whole milk) Met     Relating to beverages:  Reduce caffeine/carbonation/calorie containing beverages-Met, no coffee in a week   Separate fluids from meals by 30 minutes before, during, and after eating-Moth very with the pills.     Relating to dietary supplements:  Start a multivitamin containing iron daily-Met     Relating to activity:  Increase activity as able - try a home workout 3 times per week. -Met     Relating to cravings:  Anytime you have a craving, find an activity (such as call a family member or friend, drink water, take a walk, read a book, play a game on phone) 15 minutes to see if craving goes away-Improving       ADDITIONAL INFORMATION:  Pt's mother had bariatric surgery in the past and was successful.   Pt is in a master's program for food science.       NUTRITION DIAGNOSIS:  Obesity r/t long history of self-monitoring deficit and excessive energy intake aeb BMI >30 kg/m2.-Continues     INTERVENTION:  Intervention Provided/Education " "Reviewed post-op diet guidelines, vitamins/minerals essential post-operatively, GI anatomy of bariatric surgeries, ways to help prepare for post-op diet guidelines pre-operatively, portion/calorie-control, mindful eating, source of protein.  Provided pt with list of goals RD contact information.  - Discussed/review task list, instructed to call [facility] to schedule with surgeon and RD at the same time.    - Answered all question at this time.      Questions Reviewed With Patient 2/18/2019   How ready are you to make changes regarding your weight? Number 1 = Not ready at all to make changes up to 10 = very ready. 10   How confident are you that you can change? 1 = Not confident that you will be successful making changes up to 10 = very confident. 8       Patient Understanding: good  Expected Compliance: good    GOALS:  Relating To Eating:  * Eat slowly (20-30 minutes per meal), chewing foods well (25 chews per bite/applesauce consistency) - stop eating as soon as you feel full  9\" Plate method (1/2 plate non-starchy vegetables/fruit, 1/4 plate lean protein, 1/4 plate whole grain starch - no more than 1/2 cup carb/meal)  Avoid snacking - Limit snacking, try exercising in the evening or distracting yourself. Try protein shake ( do not use whole milk)     Relating to beverages:  Reduce caffeine/carbonation/calorie containing beverages  Separate fluids from meals by 30 minutes before, during, and after eating    Relating to dietary supplements:  Continue a multivitamin containing iron daily  Start vitamin D3 5,000 international unit(s)     Relating to activity:  Increase activity as able - Walk for 15 minutes 2x daily or reach 8,000 steps    Relating to cravings:  Anytime you have a craving, find an activity (such as call a family member or friend, drink water, take a walk, read a book, play a game on phone) 15 minutes to see if craving goes away     *Protein Shake Criteria: no more than 250 Calories, at least 20 grams of " protein, and less than 10 grams of sugar.      Follow-Up:   Recommend 1 month follow up visits to assist with lifestyle changes or per insurance.    Time spent with patient: 30 minutes.  Lesley Carranza MS, RD, LD     Again, thank you for allowing me to participate in the care of your patient.      Sincerely,    Lesley Carranza RD

## 2019-03-19 NOTE — PROGRESS NOTES
"Return Bariatric Nutrition Consultation Note    Reason For Visit: Nutrition Assessment    Myra Gordillo is a 29 year old presenting today for follow-up bariatric nutrition consult.  Pt is interested in laparoscopic sleeve gastrectomy with Dr. Escoto expected surgery in August 2019.  Patient is accompanied by self.  This is pt's second of 6 required nutrition visits prior to surgery. Pt referred by Anamaria Michael NP (2/19/19).  - Pt was previously interested in medical weight management and thinking about surgery. She has recently decided to pursue surgery. Her mom has had bariatric surgery in the past and will be coming to [place] for support post-op.      Support System Reviewed With Patient 2/18/2019   Who can you count on for support throughout your weight loss surgery journey? mother and sister       ANTHROPOMETRICS:  Estimated body mass index is 43.99 kg/m  as calculated from the following:    Height as of 2/19/19: 1.56 m (5' 1.42\").    Initial Weight as of 2/19/19: 107 kg (236 lb).    Current Weight: 3/19/19: 224.6 (-11 lb from last month/initial visit)     Required weight loss goal pre-op: 10 lbs from initial consult weight (goal weight 226 lbs or less before surgery)       2/18/2019   I have tried the following methods to lose weight Watching portions or calories, Exercise, Atkins type diet (low carb/high protein), Physician directed program       Weight Loss Questions Reviewed With Patient 2/18/2019   How long have you been overweight? Since puberty       SUPPLEMENT INFORMATION:  Multivitamin - one a day with iron and vitamin D     NUTRITION HISTORY:  Patient has a food journal and she has created 8 healthy life style goals that she goes through everyday. She reports that some mornings she has only been having a protein smoothie ( 1/4 c fruit, protein powder, milk or water or Quest protein with milk in  with caffiene free coffee for breakfast). She is snacking less throughout the day, but will " "still snack at night, up to 3 small snacks in the evening (Outshine popsicle).     Personal Health goals:  No starbucks   8,000 steps a day  No fast food   No coffee  Walk 15 minutes 2x daily    Progress Towards Previous Goals:   Relating To Eating:  * Eat slowly (20-30 minutes per meal), chewing foods well (25 chews per bite/applesauce consistency) - stop eating as soon as you feel full-continue   9\" Plate method (1/2 plate non-starchy vegetables/fruit, 1/4 plate lean protein, 1/4 plate whole grain starch - no more than 1/2 cup carb/meal)-Met  Avoid snacking - Limit snacking, try exercising in the evening or distracting yourself. Try protein shake ( do not use whole milk) Met     Relating to beverages:  Reduce caffeine/carbonation/calorie containing beverages-Met, no coffee in a week   Separate fluids from meals by 30 minutes before, during, and after eating-Moth very with the pills.     Relating to dietary supplements:  Start a multivitamin containing iron daily-Met     Relating to activity:  Increase activity as able - try a home workout 3 times per week. -Met     Relating to cravings:  Anytime you have a craving, find an activity (such as call a family member or friend, drink water, take a walk, read a book, play a game on phone) 15 minutes to see if craving goes away-Improving       ADDITIONAL INFORMATION:  Pt's mother had bariatric surgery in the past and was successful.   Pt is in a master's program for food science.       NUTRITION DIAGNOSIS:  Obesity r/t long history of self-monitoring deficit and excessive energy intake aeb BMI >30 kg/m2.-Continues     INTERVENTION:  Intervention Provided/Education Reviewed post-op diet guidelines, vitamins/minerals essential post-operatively, GI anatomy of bariatric surgeries, ways to help prepare for post-op diet guidelines pre-operatively, portion/calorie-control, mindful eating, source of protein.  Provided pt with list of goals RD contact information.  - " "Discussed/review task list, instructed to call [facility] to schedule with surgeon and RD at the same time.    - Answered all question at this time.      Questions Reviewed With Patient 2/18/2019   How ready are you to make changes regarding your weight? Number 1 = Not ready at all to make changes up to 10 = very ready. 10   How confident are you that you can change? 1 = Not confident that you will be successful making changes up to 10 = very confident. 8       Patient Understanding: good  Expected Compliance: good    GOALS:  Relating To Eating:  * Eat slowly (20-30 minutes per meal), chewing foods well (25 chews per bite/applesauce consistency) - stop eating as soon as you feel full  9\" Plate method (1/2 plate non-starchy vegetables/fruit, 1/4 plate lean protein, 1/4 plate whole grain starch - no more than 1/2 cup carb/meal)  Avoid snacking - Limit snacking, try exercising in the evening or distracting yourself. Try protein shake ( do not use whole milk)     Relating to beverages:  Reduce caffeine/carbonation/calorie containing beverages  Separate fluids from meals by 30 minutes before, during, and after eating    Relating to dietary supplements:  Continue a multivitamin containing iron daily  Start vitamin D3 5,000 international unit(s)     Relating to activity:  Increase activity as able - Walk for 15 minutes 2x daily or reach 8,000 steps    Relating to cravings:  Anytime you have a craving, find an activity (such as call a family member or friend, drink water, take a walk, read a book, play a game on phone) 15 minutes to see if craving goes away     *Protein Shake Criteria: no more than 250 Calories, at least 20 grams of protein, and less than 10 grams of sugar.      Follow-Up:   Recommend 1 month follow up visits to assist with lifestyle changes or per insurance.    Time spent with patient: 30 minutes.  Lesley Carranza, MS, RD, LD   "

## 2019-03-19 NOTE — LETTER
Date:March 21, 2019      Patient was self referred, no letter generated. Do not send.        [facility] Health Information

## 2019-03-20 VITALS — BODY MASS INDEX: 41.86 KG/M2 | WEIGHT: 224.6 LBS

## 2019-04-08 ENCOUNTER — TELEPHONE (OUTPATIENT)
Dept: SURGERY | Facility: CLINIC | Age: 29
End: 2019-04-08

## 2019-04-08 ENCOUNTER — MEDICAL CORRESPONDENCE (OUTPATIENT)
Dept: HEALTH INFORMATION MANAGEMENT | Facility: CLINIC | Age: 29
End: 2019-04-08

## 2019-04-08 ENCOUNTER — TRANSFERRED RECORDS (OUTPATIENT)
Dept: HEALTH INFORMATION MANAGEMENT | Facility: CLINIC | Age: 29
End: 2019-04-08

## 2019-04-08 NOTE — TELEPHONE ENCOUNTER
I spoke with Myra to discuss getting her psychological evaluation scheduled. The soonest I could get her in with Dr. Jordan was 6/27 but she wanted something earlier, wants surgery in May. I gave her the phone number for Carmen Liu PsyD, LP. She will call her.

## 2019-04-09 ENCOUNTER — TELEPHONE (OUTPATIENT)
Dept: SURGERY | Facility: CLINIC | Age: 29
End: 2019-04-09

## 2019-04-09 NOTE — TELEPHONE ENCOUNTER
Myra called to let me know she was having her psychological evaluation with Carmen Liu PsyD, LP on 4/18/19. She wanted to discuss an OR date because her mom needed to make reservations to take time off to be with her. I offered her 5/28/19 for the sleeve. She is okay with that date.

## 2019-04-18 ENCOUNTER — OFFICE VISIT (OUTPATIENT)
Dept: SURGERY | Facility: CLINIC | Age: 29
End: 2019-04-18
Payer: COMMERCIAL

## 2019-04-18 ENCOUNTER — TRANSFERRED RECORDS (OUTPATIENT)
Dept: HEALTH INFORMATION MANAGEMENT | Facility: CLINIC | Age: 29
End: 2019-04-18

## 2019-04-18 DIAGNOSIS — E66.813 CLASS 3 SEVERE OBESITY DUE TO EXCESS CALORIES WITH SERIOUS COMORBIDITY AND BODY MASS INDEX (BMI) OF 40.0 TO 44.9 IN ADULT (H): Primary | ICD-10-CM

## 2019-04-18 DIAGNOSIS — E66.01 CLASS 3 SEVERE OBESITY DUE TO EXCESS CALORIES WITH SERIOUS COMORBIDITY AND BODY MASS INDEX (BMI) OF 40.0 TO 44.9 IN ADULT (H): Primary | ICD-10-CM

## 2019-04-18 ASSESSMENT — MIFFLIN-ST. JEOR: SCORE: 1683.7

## 2019-04-18 NOTE — LETTER
Date:April 30, 2019      Patient was self referred, no letter generated. Do not send.        [facility] Health Information

## 2019-04-18 NOTE — LETTER
"4/18/2019       RE: Myra Yang  [address]  [place] [address]     Dear Colleague,    Thank you for referring your patient, Myra Yang, to the [facility] SURGICAL WEIGHT MANAGEMENT at [facility]. Please see a copy of my visit note below.    Return Bariatric Nutrition Consultation Note    Reason For Visit: Nutrition Assessment    Myra Gordillo is a 29 year old presenting today for follow-up bariatric nutrition consult.  Pt is interested in laparoscopic sleeve gastrectomy with Dr. Escoto expected surgery in August 2019.  Patient is accompanied by self.  This is pt's 3rd of 3 required nutrition visits prior to surgery. Pt referred by Anamaria Michael NP (2/19/19).  - Pt was previously interested in medical weight management and thinking about surgery. She has recently decided to pursue surgery. Her mom has had bariatric surgery in the past and will be coming to [place] for support post-op.      Care Coordination :   - 4/18/19: Per care coordinator pt only need 3 RD visit for insurance.     Support System Reviewed With Patient 2/18/2019   Who can you count on for support throughout your weight loss surgery journey? mother and sister       ANTHROPOMETRICS:  Estimated body mass index is 43.99 kg/m  as calculated from the following:    Height as of 2/19/19: 1.56 m (5' 1.42\").    Initial Weight as of 2/19/19: 107 kg (236 lb).    Current Weight: 4/18/19: 222.6 lb (-2 lb from last month, -13 lb from initial visit)     Required weight loss goal pre-op: 10 lbs from initial consult weight (goal weight 226 lbs or less before surgery)       2/18/2019   I have tried the following methods to lose weight Watching portions or calories, Exercise, Atkins type diet (low carb/high protein), Physician directed program       Weight Loss Questions Reviewed With Patient 2/18/2019   How long have you been overweight? Since puberty       SUPPLEMENT " "INFORMATION:  Multivitamin - one a day with iron and vitamin D 5,000 international unit(s)      NUTRITION HISTORY:  Patient continues to use her food journal and she has created 8 healthy life style goals that she goes through everyday. She continues have a protein smoothie in the morning ( 1/4 c fruit, protein powder, milk or water or Quest protein with milk in  with caffeine free coffee for breakfast). She is snacking less throughout the day, but will still snack at night, up to 3 small snacks in the evening (Outshine popsicle).     Personal Health goals:  No starbucks   8,000 steps a day  No fast food   No coffee  Walk 15 minutes 2x daily    Progress Towards Previous Goals:   Relating To Eating:  * Eat slowly (20-30 minutes per meal), chewing foods well (25 chews per bite/applesauce consistency) - stop eating as soon as you feel full-Taking longer for the smoothies, improving still working   9\" Plate method (1/2 plate non-starchy vegetables/fruit, 1/4 plate lean protein, 1/4 plate whole grain starch - no more than 1/2 cup carb/meal)-dinner sometimes is a cashew butter smoothie with hemp seed with some ice, stevia or date syrup sometimes after surgery.   Quinoa with tilapia or Chicken.   Avoid snacking - Limit snacking, try exercising in the evening or distracting yourself. Try protein shake ( do not use whole milk) -Not using whole milk in smoothie.      Relating to beverages:  Reduce caffeine/carbonation/calorie containing beverages-Improving, only two starbucks in the past month from [employer].   Separate fluids from meals by 30 minutes before, during, and after eating-Improving    Relating to dietary supplements:  Continue a multivitamin containing iron daily-Met   Start vitamin D3 5,000 international unit(s) -Met     Relating to activity:  Increase activity as able - Walk for 15 minutes 2x daily or reach 8,000 steps-Met, walking a lot and taking stairs. 6,000- 8,000 pedometer broke, she is no longer " tracking steps.      Relating to cravings:  Anytime you have a craving, find an activity (such as call a family member or friend, drink water, take a walk, read a book, play a game on phone) 15 minutes to see if craving goes away- Did not discuss during this visit.     *Protein Shake Criteria: no more than 250 Calories, at least 20 grams of protein, and less than 10 grams of sugar. - continues       ADDITIONAL INFORMATION:  Pt's mother had bariatric surgery in the past and was successful.   Pt is in a master's program for food science.     NUTRITION DIAGNOSIS:  Obesity r/t long history of self-monitoring deficit and excessive energy intake aeb BMI >30 kg/m2.-Continues   And  Food- and Nutrition-related knowledge deficit r/t lack of prior exposure to information AEB pt unable to verbalize main points of bariatric clear and low-fat full liquid diet    INTERVENTION:  Intervention Provided/Education Provided/Reviewed previous goals and encouraged patient to continue goals prior to surgery.     Provided instruction on bariatric clear and low-fat full liquid diets.  Provided the following handouts: Diet Guidelines for Bariatric Surgery, Sources of Protein, Keeping Track of Your Fluids, list of recommended vitamin/mineral supplementation after SG surgery and RD contact information.  - Discussed protein shakes for after surgery. Recommended smooth protein shakes without seeds or nuts until later on in her diet progression. Current protein smoothies are thick and chunky. Pt familiar with some options as her mom used protein shake after surgery.    - Answered all question at this time.      Questions Reviewed With Patient 2/18/2019   How ready are you to make changes regarding your weight? Number 1 = Not ready at all to make changes up to 10 = very ready. 10   How confident are you that you can change? 1 = Not confident that you will be successful making changes up to 10 = very confident. 8       Patient Understanding:  "good  Expected Compliance: good    GOALS:  Relating To Eating:  * Eat slowly (20-30 minutes per meal), chewing foods well (25 chews per bite/applesauce consistency) - stop eating as soon as you feel full  9\" Plate method (1/2 plate non-starchy vegetables/fruit, 1/4 plate lean protein, 1/4 plate whole grain starch - no more than 1/2 cup carb/meal)  Avoid snacking - Limit snacking, try exercising in the evening or distracting yourself. Try protein shake ( do not use whole milk)     Relating to beverages:  Reduce caffeine/carbonation/calorie containing beverages  Separate fluids from meals by 30 minutes before, during, and after eating    Relating to dietary supplements:  Continue a multivitamin containing iron daily  Start vitamin D3 5,000 international unit(s)     Relating to activity:  Increase activity as able - Walk for 15 minutes 2x daily or reach 8,000 steps    Relating to cravings:  Anytime you have a craving, find an activity (such as call a family member or friend, drink water, take a walk, read a book, play a game on phone) 15 minutes to see if craving goes away     *Protein Shake Criteria: no more than 250 Calories, at least 20 grams of protein, and less than 10 grams of sugar.      Goals after surgery:   1. Follow the bariatric post-op diet advancement schedule:  - Bariatric clear liquid diet through post-op day 1 (while in the hospital).  - Bariatric low-fat full liquid diet on post-op days 2 through 13 (2 weeks).  2. Sip on 48-64 oz (or greater) fluids daily, recording intake to help stay on-track.  - Drink at least 2 oz of fluid every 30 min.    Follow-Up:   Recommend 1 month follow up visits to assist with lifestyle changes or per insurance.    Time spent with patient: 30 minutes.  Lesley Carranza, MS, RD, LD               .      Again, thank you for allowing me to participate in the care of your patient.      Sincerely,    Lesley Carranza RD      "

## 2019-04-18 NOTE — NURSING NOTE
"(   Chief Complaint   Patient presents with     RECHECK     PBS, PLS DO PREOP TEACHING, HOPING FOR MAY OR DATE    )    ( Weight: 101 kg (222 lb 9.6 oz) )  ( Height: 156 cm (5' 1.42\") )  ( BMI (Calculated): 41.49 )  ( Initial Weight: 107 kg (236 lb) )  ( Cumulative weight loss (lbs): 13.4 )  ( Last Visits Weight: 107 kg (236 lb) )  ( Wt change since last visit (lbs): -13.4 )  (   )  (   )    ( BP: 126/84 )  (   )  ( Temp: 98.2  F (36.8  C) )  ( Temp src: Oral )  ( Pulse: 80 )  (   )  ( SpO2: 97 % )    ( There is no problem list on file for this patient.   )  (   Current Outpatient Medications   Medication Sig Dispense Refill     buPROPion (WELLBUTRIN XL) 300 MG 24 hr tablet Take 300 mg by mouth every morning       cetirizine (ZYRTEC) 10 MG tablet Take 10 mg by mouth daily       melatonin 5 MG CAPS Take 1 tablet by mouth daily       Misc Natural Products (GLUCOSAMINE CHONDROITIN ADV PO) Take 1 tablet by mouth daily       Phentermine-Topiramate 7.5-46 MG CP24 Take 1 capsule by mouth daily 30 capsule 3    )  ( Diabetes Eval:    )    ( Pain Eval:  Data Unavailable )    ( Wound Eval:       )    (   History   Smoking Status     Never Smoker   Smokeless Tobacco     Never Used    )    ( Signed By:  Yonny Gomez; April 18, 2019; 8:23 AM )    "

## 2019-04-18 NOTE — LETTER
Date:April 24, 2019      Patient was self referred, no letter generated. Do not send.        [facility] Health Information

## 2019-04-18 NOTE — NURSING NOTE
This patient is having Laparoscopic sleeve gastrectomy by Dr. Escoto.    The following handouts were reviewed with the patient :  Before Your Surgery, Patient Checklist, Weight Loss Surgery Pre-operative Class, Preop Recommendations Quick Reference Guide, History and Physical, Medications to Avoid, Shower or Bathing Before Surgery, Bowel Preparation, Powerful Choices and [place] Advance Health Care Directive.  Questions were addressed and understanding of content was verbalized.  Contact information was provided.    Patient goal weight: 226  Weight today: 222    Call [facility] at [phone] for scheduling.

## 2019-04-18 NOTE — LETTER
"4/18/2019       RE: Myra Yang  [address]  [place] [address]     Dear Colleague,    Thank you for referring your patient, Myra Yang, to the [facility] SURGICAL WEIGHT MANAGEMENT at [facility]. Please see a copy of my visit note below.            2/18/2019   Who referred you? Rose Mary Luna     I was asked to see the patient regarding obesity by the referring provider above.    I had the pleasure of meeting with your patient Myra Yang in our weight loss surgery office.  This patient is a 29 year old female who has been undergoing our thorough preoperative screening process in anticipation of potential bariatric surgery.    At initial evaluation we recorded Myra Gordillo's height of 5' 1.417\", a weight of 236 lbs 0 oz, and calculated  body mass index is 43.99 kg/m .   Class III obesity is associated with high cholesterol.    The patient has been unsuccessful with other methods of permanent weight loss and suffers from multiple weight related medical conditions.  Due to lack of success in achieving weight loss through other methods, she is interested in undergoing bariatric surgery.    PREVIOUS WEIGHT LOSS ATTEMPTS:     2/18/2019   I have tried the following methods to lose weight Watching portions or calories, Exercise, Atkins type diet (low carb/high protein), Physician directed program       CO-MORBIDITIES OF OBESITY INCLUDE:     2/18/2019   I have the following co-morbidities associated with obesity: High Cholesterol       VITALS:  /84   Pulse 80   Temp 98.2  F (36.8  C) (Oral)   Ht 1.56 m (5' 1.42\")   Wt 101 kg (222 lb 9.6 oz)   SpO2 97%   BMI 41.49 kg/m       PE:  GENERAL: Alert and oriented x3. NAD  HEENT exam: Sclerae not icteric. Hearing good. Head normocephalic and atraumatic.   CARDIOVASCULAR: No JVD  RESPIRATORY: Breathing unlabored  GASTROINTESTINAL: Obese  LOWER EXTREMITIES: no " deformities  MUSCULOSKELETAL: Normal gait, Moves all 4 extremities equal and strong  NEUROLOGIC: no gross defect  SKIN: warm and dry to touch     In summary, she has undergone an appropriate medical evaluation, dietitian evaluation, as well as psychologic screening. The patient appears to be an appropriate candidate for bariatric surgery.    In the office today, I discussed the laparoscopic gastric sleeve surgery.  Risks, benefits and anticipated outcomes were outlined including the risk of death, staple line leak (1-2%), PE, DVT, ulcer, worsening GERD, N/V, stricture, hernia, wound infection, weight regain, and vitamin deficiencies. This patient has a good chance of sustaining permanent weight loss due to this procedure.  This should also allow improvement if not resolution of his/her weight related medical conditions.    At present we are going to present your patient's file for prior authorization to insurance.  Pending prior authorization, I anticipate a surgical date in the near future.  We will keep you updated on any progress.  If you have questions regarding care please feel free to contact me.  Total time spent in the clinic was 20 minutes with greater than 50% in face-to-face consultation.        Sincerely,    Baldemar Escoto    Please route or send letter to:  Primary Care Provider (PCP) and Referring Provider    Again, thank you for allowing me to participate in the care of your patient.      Sincerely,    Baldemar Escoto MD

## 2019-04-18 NOTE — PATIENT INSTRUCTIONS
It was a pleasure meeting with you today.     Thank you for allowing us the privilege of caring for you. We hope we provided you with the excellent service you deserve.     Please let us know if there is anything else we can do for you so that we can be sure you are leaving completely satisfied with your care experience.      You saw Dr Escoto today.     Instructions per today's visit: Call [facility] at [phone] for scheduling.      To schedule appointments with our team, please call [phone] option #1    Please call during clinic hours Monday through Friday 8:00a - 4:00p if you have questions or you can contact us via [employer] at anytime.      Nurses: [phone]  Fax: [phone]  Surgery Scheduler: [phone]    Please call the hospital at [phone] to speak with our on call MDs if you have urgent needs after hours, during weekends, or holidays.    Please note if you need to go to the Emergency Room for any reason in the first 90 days after surgery it is important to go to the [facility] ER on the [place] on [address] off of the [address] exit off [address]    *For patients who are currently enrolled in our program and have not yet had weight loss surgery please note*:    You must be at your required goal weight at the time of your Anesthesia clinic visit as well as the day of surgery or your surgery will be canceled. You will not be able to obtain a new surgery date until you have met your goal weight.    Lab results will be communicated through My Chart or letter (if My Chart not used). Please call the clinic if you have not received communication after 1 week or if you have any questions.?      Weight loss medications before and after surgery protocol:    Adipex (phentermine): Stop two days before surgery. Do not restart after surgery. May reconsider restarting as needed in the future.    Topimax (topiramate): Can take right up to surgery including morning of surgery and restart post op day  #1.    Qsymia (phentermine/topiramate): Hold morning of surgery. Restart after surgery post op day #1    Contrave (bupropion/naltrxone): Fast taper before surgery. 1 tablet twice daily for 1 week and then discontinue 4 days before surgery.  Will reconsider restarting at postop appt once no longer taking narcotic pain meds.  Will slowly ramp up again.    Naltrexone: Stop 4 days before surgery.  Will reconsider starting again at postop appts when no longer taking narcotic pain meds.    Belviq (locaserin): Stop 2 days before surgery and restart immediately after surgery    Victoza(liraglutide)/Saxenda(liraglutide 3mg)/Trulicity (dulaglutide) (Any GLP-1): Can take up to surgery date and restart immediately after surgery.    Main follow-up parameters for all bariatric patients     Patients who have undergone Bariatric surgery:    1. Follow-up interval during the first year: ~1 week, 1 month, 3 month, 6 month,12 months.  Every 6 months until 5 years; and then annually thereafter.  The goal of follow-up sessions is to ensure that food intake behavior (choice of food and eating speed) and exercise/activity level are set appropriately.    2. Yearly lab tests ordered by our clinic.      3. The bariatric team should be aware and potentially evaluate all adverse gastrointestinal symptoms (dysphagia, abdominal pain, nausea, vomiting, diarrhea, heartburn, reflux, etc), which can be a sign of complication.  The bariatric surgeons perform general surgery procedures in addition to bariatric surgery (laparoscopic cholecystectomy, etc.)    4. Inability to tolerate textured food (chicken, steak, fish, eggs, beans) is NOT normal and may need to be evaluated by endoscopy performed by the bariatric surgeon.

## 2019-04-18 NOTE — PROGRESS NOTES
"Return Bariatric Nutrition Consultation Note    Reason For Visit: Nutrition Assessment    Myra Gordillo is a 29 year old presenting today for follow-up bariatric nutrition consult.  Pt is interested in laparoscopic sleeve gastrectomy with Dr. Escoto expected surgery in August 2019.  Patient is accompanied by self.  This is pt's 3rd of 3 required nutrition visits prior to surgery. Pt referred by Anamaria Michael NP (2/19/19).  - Pt was previously interested in medical weight management and thinking about surgery. She has recently decided to pursue surgery. Her mom has had bariatric surgery in the past and will be coming to [place] for support post-op.      Care Coordination :   - 4/18/19: Per care coordinator pt only need 3 RD visit for insurance.     Support System Reviewed With Patient 2/18/2019   Who can you count on for support throughout your weight loss surgery journey? mother and sister       ANTHROPOMETRICS:  Estimated body mass index is 43.99 kg/m  as calculated from the following:    Height as of 2/19/19: 1.56 m (5' 1.42\").    Initial Weight as of 2/19/19: 107 kg (236 lb).    Current Weight: 4/18/19: 222.6 lb (-2 lb from last month, -13 lb from initial visit)     Required weight loss goal pre-op: 10 lbs from initial consult weight (goal weight 226 lbs or less before surgery)       2/18/2019   I have tried the following methods to lose weight Watching portions or calories, Exercise, Atkins type diet (low carb/high protein), Physician directed program       Weight Loss Questions Reviewed With Patient 2/18/2019   How long have you been overweight? Since puberty       SUPPLEMENT INFORMATION:  Multivitamin - one a day with iron and vitamin D 5,000 international unit(s)      NUTRITION HISTORY:  Patient continues to use her food journal and she has created 8 healthy life style goals that she goes through everyday. She continues have a protein smoothie in the morning ( 1/4 c fruit, protein powder, milk or water " "or Quest protein with milk in  with caffeine free coffee for breakfast). She is snacking less throughout the day, but will still snack at night, up to 3 small snacks in the evening (Outshine popsicle).     Personal Health goals:  No starbucks   8,000 steps a day  No fast food   No coffee  Walk 15 minutes 2x daily    Progress Towards Previous Goals:   Relating To Eating:  * Eat slowly (20-30 minutes per meal), chewing foods well (25 chews per bite/applesauce consistency) - stop eating as soon as you feel full-Taking longer for the smoothies, improving still working   9\" Plate method (1/2 plate non-starchy vegetables/fruit, 1/4 plate lean protein, 1/4 plate whole grain starch - no more than 1/2 cup carb/meal)-dinner sometimes is a cashew butter smoothie with hemp seed with some ice, stevia or date syrup sometimes after surgery.   Quinoa with tilapia or Chicken.   Avoid snacking - Limit snacking, try exercising in the evening or distracting yourself. Try protein shake ( do not use whole milk) -Not using whole milk in smoothie.      Relating to beverages:  Reduce caffeine/carbonation/calorie containing beverages-Improving, only two starbucks in the past month from [employer].   Separate fluids from meals by 30 minutes before, during, and after eating-Improving    Relating to dietary supplements:  Continue a multivitamin containing iron daily-Met   Start vitamin D3 5,000 international unit(s) -Met     Relating to activity:  Increase activity as able - Walk for 15 minutes 2x daily or reach 8,000 steps-Met, walking a lot and taking stairs. 6,000- 8,000 pedometer broke, she is no longer tracking steps.      Relating to cravings:  Anytime you have a craving, find an activity (such as call a family member or friend, drink water, take a walk, read a book, play a game on phone) 15 minutes to see if craving goes away- Did not discuss during this visit.     *Protein Shake Criteria: no more than 250 Calories, at least 20 " "grams of protein, and less than 10 grams of sugar. - continues       ADDITIONAL INFORMATION:  Pt's mother had bariatric surgery in the past and was successful.   Pt is in a master's program for food science.     NUTRITION DIAGNOSIS:  Obesity r/t long history of self-monitoring deficit and excessive energy intake aeb BMI >30 kg/m2.-Continues   And  Food- and Nutrition-related knowledge deficit r/t lack of prior exposure to information AEB pt unable to verbalize main points of bariatric clear and low-fat full liquid diet    INTERVENTION:  Intervention Provided/Education Provided/Reviewed previous goals and encouraged patient to continue goals prior to surgery.     Provided instruction on bariatric clear and low-fat full liquid diets.  Provided the following handouts: Diet Guidelines for Bariatric Surgery, Sources of Protein, Keeping Track of Your Fluids, list of recommended vitamin/mineral supplementation after SG surgery and RD contact information.  - Discussed protein shakes for after surgery. Recommended smooth protein shakes without seeds or nuts until later on in her diet progression. Current protein smoothies are thick and chunky. Pt familiar with some options as her mom used protein shake after surgery.    - Answered all question at this time.      Questions Reviewed With Patient 2/18/2019   How ready are you to make changes regarding your weight? Number 1 = Not ready at all to make changes up to 10 = very ready. 10   How confident are you that you can change? 1 = Not confident that you will be successful making changes up to 10 = very confident. 8       Patient Understanding: good  Expected Compliance: good    GOALS:  Relating To Eating:  * Eat slowly (20-30 minutes per meal), chewing foods well (25 chews per bite/applesauce consistency) - stop eating as soon as you feel full  9\" Plate method (1/2 plate non-starchy vegetables/fruit, 1/4 plate lean protein, 1/4 plate whole grain starch - no more than 1/2 cup " carb/meal)  Avoid snacking - Limit snacking, try exercising in the evening or distracting yourself. Try protein shake ( do not use whole milk)     Relating to beverages:  Reduce caffeine/carbonation/calorie containing beverages  Separate fluids from meals by 30 minutes before, during, and after eating    Relating to dietary supplements:  Continue a multivitamin containing iron daily  Start vitamin D3 5,000 international unit(s)     Relating to activity:  Increase activity as able - Walk for 15 minutes 2x daily or reach 8,000 steps    Relating to cravings:  Anytime you have a craving, find an activity (such as call a family member or friend, drink water, take a walk, read a book, play a game on phone) 15 minutes to see if craving goes away     *Protein Shake Criteria: no more than 250 Calories, at least 20 grams of protein, and less than 10 grams of sugar.      Goals after surgery:   1. Follow the bariatric post-op diet advancement schedule:  - Bariatric clear liquid diet through post-op day 1 (while in the hospital).  - Bariatric low-fat full liquid diet on post-op days 2 through 13 (2 weeks).  2. Sip on 48-64 oz (or greater) fluids daily, recording intake to help stay on-track.  - Drink at least 2 oz of fluid every 30 min.    Follow-Up:   Recommend 1 month follow up visits to assist with lifestyle changes or per insurance.    Time spent with patient: 30 minutes.  Lesley Carranza, MS, RD, LD               .

## 2019-04-18 NOTE — PATIENT INSTRUCTIONS
"GOALS:  Relating To Eating:  * Eat slowly (20-30 minutes per meal), chewing foods well (25 chews per bite/applesauce consistency) - stop eating as soon as you feel full  9\" Plate method (1/2 plate non-starchy vegetables/fruit, 1/4 plate lean protein, 1/4 plate whole grain starch - no more than 1/2 cup carb/meal)  Avoid snacking - Limit snacking, try exercising in the evening or distracting yourself. Try protein shake ( do not use whole milk)     Relating to beverages:  Reduce caffeine/carbonation/calorie containing beverages  Separate fluids from meals by 30 minutes before, during, and after eating    Relating to dietary supplements:  Continue a multivitamin containing iron daily  Start vitamin D3 5,000 international unit(s)     Relating to activity:  Increase activity as able - Walk for 15 minutes 2x daily or reach 8,000 steps    Relating to cravings:  Anytime you have a craving, find an activity (such as call a family member or friend, drink water, take a walk, read a book, play a game on phone) 15 minutes to see if craving goes away     *Protein Shake Criteria: no more than 250 Calories, at least 20 grams of protein, and less than 10 grams of sugar.      Goals after surgery:   1. Follow the bariatric post-op diet advancement schedule:  - Bariatric clear liquid diet through post-op day 1 (while in the hospital).  - Bariatric low-fat full liquid diet on post-op days 2 through 13 (2 weeks).  2. Sip on 48-64 oz (or greater) fluids daily, recording intake to help stay on-track.  - Drink at least 2 oz of fluid every 30 min.    Call [facility] at [phone] to discuss weight loss surgery date, and related appointments.    Follow up with RD in one week after surgery.     Lesley Carranza, MS, RD, LD  If you need to schedule or reschedule with a dietitian please call [phone].  "

## 2019-04-19 VITALS
WEIGHT: 222.6 LBS | DIASTOLIC BLOOD PRESSURE: 84 MMHG | HEIGHT: 61 IN | HEART RATE: 80 BPM | SYSTOLIC BLOOD PRESSURE: 126 MMHG | OXYGEN SATURATION: 97 % | TEMPERATURE: 98.2 F | BODY MASS INDEX: 42.03 KG/M2

## 2019-04-19 DIAGNOSIS — E66.9 OBESITY: Primary | ICD-10-CM

## 2019-04-22 ENCOUNTER — TELEPHONE (OUTPATIENT)
Dept: SURGERY | Facility: CLINIC | Age: 29
End: 2019-04-22

## 2019-04-22 NOTE — TELEPHONE ENCOUNTER
Spoke with patient as Corrine Mosher, [facility] Pharmacist, states she does not need to see Myra as she is on limited medications and PAC can cover her meds when she comes in 5/13.

## 2019-04-29 NOTE — PROGRESS NOTES
"        2/18/2019   Who referred you? Rose Mary Luna     I was asked to see the patient regarding obesity by the referring provider above.    I had the pleasure of meeting with your patient Myra Yang in our weight loss surgery office.  This patient is a 29 year old female who has been undergoing our thorough preoperative screening process in anticipation of potential bariatric surgery.    At initial evaluation we recorded Myra Gordillo's height of 5' 1.417\", a weight of 236 lbs 0 oz, and calculated body mass index is 43.99 kg/m .  Class III obesity is associated with high cholesterol.    The patient has been unsuccessful with other methods of permanent weight loss and suffers from multiple weight related medical conditions.  Due to lack of success in achieving weight loss through other methods, she is interested in undergoing bariatric surgery.    PREVIOUS WEIGHT LOSS ATTEMPTS:     2/18/2019   I have tried the following methods to lose weight Watching portions or calories, Exercise, Atkins type diet (low carb/high protein), Physician directed program       CO-MORBIDITIES OF OBESITY INCLUDE:     2/18/2019   I have the following co-morbidities associated with obesity: High Cholesterol       VITALS:  /84   Pulse 80   Temp 98.2  F (36.8  C) (Oral)   Ht 1.56 m (5' 1.42\")   Wt 101 kg (222 lb 9.6 oz)   SpO2 97%   BMI 41.49 kg/m      PE:  GENERAL: Alert and oriented x3. NAD  HEENT exam: Sclerae not icteric. Hearing good. Head normocephalic and atraumatic.   CARDIOVASCULAR: No JVD  RESPIRATORY: Breathing unlabored  GASTROINTESTINAL: Obese  LOWER EXTREMITIES: no deformities  MUSCULOSKELETAL: Normal gait, Moves all 4 extremities equal and strong  NEUROLOGIC: no gross defect  SKIN: warm and dry to touch     In summary, she has undergone an appropriate medical evaluation, dietitian evaluation, as well as psychologic screening. The patient appears to be an appropriate candidate for " bariatric surgery.    In the office today, I discussed the laparoscopic gastric sleeve surgery.  Risks, benefits and anticipated outcomes were outlined including the risk of death, staple line leak (1-2%), PE, DVT, ulcer, worsening GERD, N/V, stricture, hernia, wound infection, weight regain, and vitamin deficiencies. This patient has a good chance of sustaining permanent weight loss due to this procedure.  This should also allow improvement if not resolution of his/her weight related medical conditions.    At present we are going to present your patient's file for prior authorization to insurance.  Pending prior authorization, I anticipate a surgical date in the near future.  We will keep you updated on any progress.  If you have questions regarding care please feel free to contact me.  Total time spent in the clinic was 20 minutes with greater than 50% in face-to-face consultation.        Sincerely,    Baldemar Escoto    Please route or send letter to:  Primary Care Provider (PCP) and Referring Provider

## 2019-05-13 ENCOUNTER — OFFICE VISIT (OUTPATIENT)
Dept: SURGERY | Facility: CLINIC | Age: 29
End: 2019-05-13
Payer: COMMERCIAL

## 2019-05-13 ENCOUNTER — CARE COORDINATION (OUTPATIENT)
Dept: SURGERY | Facility: CLINIC | Age: 29
End: 2019-05-13

## 2019-05-13 ENCOUNTER — ANESTHESIA EVENT (OUTPATIENT)
Dept: SURGERY | Facility: CLINIC | Age: 29
End: 2019-05-13

## 2019-05-13 VITALS
SYSTOLIC BLOOD PRESSURE: 134 MMHG | TEMPERATURE: 97.3 F | WEIGHT: 223 LBS | HEART RATE: 84 BPM | OXYGEN SATURATION: 98 % | BODY MASS INDEX: 42.1 KG/M2 | DIASTOLIC BLOOD PRESSURE: 84 MMHG | HEIGHT: 61 IN

## 2019-05-13 DIAGNOSIS — E66.01 MORBID OBESITY (H): ICD-10-CM

## 2019-05-13 DIAGNOSIS — Z01.818 PREOP EXAMINATION: ICD-10-CM

## 2019-05-13 DIAGNOSIS — E66.01 MORBID OBESITY (H): Primary | ICD-10-CM

## 2019-05-13 LAB
ALBUMIN UR-MCNC: NEGATIVE MG/DL
AMORPH CRY #/AREA URNS HPF: ABNORMAL /HPF
APPEARANCE UR: ABNORMAL
BILIRUB UR QL STRIP: NEGATIVE
COLOR UR AUTO: YELLOW
GLUCOSE UR STRIP-MCNC: NEGATIVE MG/DL
HGB UR QL STRIP: NEGATIVE
KETONES UR STRIP-MCNC: NEGATIVE MG/DL
LEUKOCYTE ESTERASE UR QL STRIP: NEGATIVE
MUCOUS THREADS #/AREA URNS LPF: PRESENT /LPF
NITRATE UR QL: NEGATIVE
PH UR STRIP: 7 PH (ref 5–7)
RBC #/AREA URNS AUTO: 1 /HPF (ref 0–2)
SOURCE: ABNORMAL
SP GR UR STRIP: 1.01 (ref 1–1.03)
UROBILINOGEN UR STRIP-MCNC: 0 MG/DL (ref 0–2)
WBC #/AREA URNS AUTO: 0 /HPF (ref 0–5)

## 2019-05-13 RX ORDER — ESCITALOPRAM OXALATE 10 MG/1
10 TABLET ORAL DAILY
COMMUNITY

## 2019-05-13 RX ORDER — CHOLECALCIFEROL (VITAMIN D3) 50 MCG
2000 TABLET ORAL DAILY
COMMUNITY

## 2019-05-13 RX ORDER — MULTIVITAMIN
1 TABLET ORAL DAILY
COMMUNITY

## 2019-05-13 ASSESSMENT — MIFFLIN-ST. JEOR: SCORE: 1685.56

## 2019-05-13 ASSESSMENT — LIFESTYLE VARIABLES: TOBACCO_USE: 0

## 2019-05-13 NOTE — OR NURSING
PAC visit Date 5/13/2019   Patient Goal Weight 226 pounds  Actual Weight 223 pounds   Met goal weight  Yes

## 2019-05-13 NOTE — PROGRESS NOTES
Tasklist updates.    Bariatric Task List  Status:  Is patient a candidate for bariatric surgery?:  patient is a candidate for bariatric surgery -     Cleared to schedule surgeon consult?:  cleared to schedule surgeon consult -     Status:  surgery evaluation in process -     Surgeon: Dr. Escoto -     Tentative surgery month/year: May 2019 -> 5/28/19 -        Insurance: Insurance:  [employer] -     [employer]: PCP Recommendation and Medical Clearance:    -     HP Referral:  Completed - [facility] done 5/9/19   Other:    -        Patient Info: Initial Weight:  236lb -     Date of Initial Weight/Height:  2/19/2019 -     Goal Weight (lbs):  226 -     Required Weight Loss:  10 -     Surgery Type:  sleeve gastrectomy -     Multidisciplinary Meeting:    -        Dietician Visits: Structured weight loss required by insurance?:  structured weight loss required -     Dietician Visit 1:  Completed - February   Dietician Visit 2:  Completed - March appt   Dietician Visit 3:  Completed - April. Call [facility] at [phone] to schedule same day as visit with surgeon.   Dietician Visit 4:    -     Dietician Visit 5:    -     Dietician Visit 6:    -     Dietician Visit additional:    -     Clearance from dietician to see surgeon?:    -     Dietician Notes:    -        Psychological Evaluation: Psych eval:  Completed - 4/18/19 Dr Liu - cleared.   Therapist letter of support:    -     Psychiatrist letter of support:  Completed - 4/8/19 Willow Corbin MD - ltr of support.   Establish care with therapist:    -     Complete eating disorder evaluation:    -     Letter of clearance from therapist/eating disorder program:    -     Other:    -        Lab Work: Complete Blood Count:  Completed - 2/19/19   Comprehensive Metabolic Panel:  Completed -     Vitamin D:  Completed -     Hgb A1c:  Completed -     PTH:  Completed -     H. pylori:    -     TSH:    -     Nicotine Testing:    -     Other:    -        Consults/ Clearance: Sleep  "Medicine:    -     Cardiac:    -     Pain:   -     Dental:    -     Endocrine:    -     Gastroenterology:    -     Vascular Medicine:    -     Hematology:    -     Medical Weight Management: Completed - Medication prescribed.   Physical Therapy/Exercise:    -     Nephrology:    -     Neurology:    -     Pulmonology:    -     Rheumatology:    -     Other    -     Other    -     Other    -           Testing: UGI:    -     EGD:    -     Other:    -        PCP: Establish care with PCP:  Completed -     Follow up with PCP:    -     PCP letter of support:  Completed - 2/19/19 Rose Mary Luna Pa-C referral from [facility] for wt loss.      Smoking: Quit tobacco use (3 months smoke free)?:    -     Quit date:    -        Patient Education:  Information Session:  Completed -     Given \"Making your decision\" handout?:  Given \"\"Making your decision\"\" handout? -     Given support group information?:  support group contact information provided -     Attended support group?:    -     Support plan in place?:    -     Research consents signed?:    -        Additional Surgery Requirements: Review Coag plan:    -     HgA1c <8:    -     Inpatient pain consult:    -     Final nicotine screen:    -     Dental work complete:    -     Birth control plan:    -     Other Requirements:    -     Other Requirements:    -        Final Tasks:  Before surgery online class:  Needed -     Before surgery online class website link:  [email or web address]   After surgery online class:  Needed -     After surgery online class website link:  [email or web address]   Nurse visit for weigh-in and information:  Needed -     Pre-assessment clinic visit with anesthesia team for H&P:  Needed -     Final labs (Hgb, plt, T&S, UA):  Needed -        Notes:   -           "

## 2019-05-13 NOTE — ANESTHESIA PREPROCEDURE EVALUATION
Anesthesia Pre-Procedure Evaluation    Patient: Myra Yang   MRN:     [id number] Gender:   female   Age:    29 year old :      1990        Preoperative Diagnosis: * No surgery found *        Past Medical History:   Diagnosis Date     Autism spectrum 2004     Degenerative arthritis of knee      Generalized anxiety disorder      Neurofibromatosis, type 1 (H) 1991     Social anxiety disorder       No past surgical history on file.       Anesthesia Evaluation     . Pt has had prior anesthetic. Type: MAC    No history of anesthetic complications          ROS/MED HX    ENT/Pulmonary:     (+)RENEA risk factors obese, , . .   (-) tobacco use and recent URI   Neurologic:       Cardiovascular:     (+) Dyslipidemia, ----. : . . . :. . No previous cardiac testing       METS/Exercise Tolerance:  3 - Able to walk 1-2 blocks without stopping   Hematologic:        (-) history of blood clots and History of Transfusion   Musculoskeletal: Comment: Bilateral knees osteoarthritis  (+) arthritis,  -       GI/Hepatic:  - neg GI/hepatic ROS       Renal/Genitourinary:  - ROS Renal section negative       Endo:  - neg endo ROS       Psychiatric: Comment: Autism spectrum disorder    (+) psychiatric history anxiety      Infectious Disease:  - neg infectious disease ROS       Malignancy:      - no malignancy   Other:    (+) No chance of pregnancy no H/O Chronic Pain,no other significant disability                        PHYSICAL EXAM:   Mental Status/Neuro: A/A/O; Age Appropriate   Airway: Facies: Feasible  Mallampati: I  Mouth/Opening: Full  TM distance: > 6 cm  Neck ROM: Full   Respiratory: Auscultation: CTAB     Resp. Rate: Normal     Resp. Effort: Normal      CV: Rhythm: Regular  Rate: Age appropriate  Heart: Normal Sounds   Comments:      Dental:  Dental Comments: Missing lower right posterior tooth                Lab Results   Component Value Date    WBC 8.1 2019    HGB 12.7 2019    HCT 41.4 2019  "    02/19/2019     02/19/2019    POTASSIUM 4.0 02/19/2019    CHLORIDE 105 02/19/2019    CO2 27 02/19/2019    BUN 13 02/19/2019    CR 0.50 (L) 02/19/2019    GLC 83 02/19/2019    GLEN 8.1 (L) 02/19/2019    ALBUMIN 3.5 02/19/2019    PROTTOTAL 7.0 02/19/2019    ALT 18 02/19/2019    AST 13 02/19/2019    ALKPHOS 87 02/19/2019    BILITOTAL 0.3 02/19/2019       Preop Vitals  BP Readings from Last 3 Encounters:   04/19/19 126/84   02/19/19 125/73    Pulse Readings from Last 3 Encounters:   04/19/19 80   02/19/19 68      Resp Readings from Last 3 Encounters:   No data found for Resp    SpO2 Readings from Last 3 Encounters:   04/19/19 97%   02/19/19 98%      Temp Readings from Last 1 Encounters:   04/19/19 98.2  F (36.8  C) (Oral)    Ht Readings from Last 1 Encounters:   04/18/19 1.56 m (5' 1.42\")      Wt Readings from Last 1 Encounters:   04/18/19 101 kg (222 lb 9.6 oz)    Estimated body mass index is 41.49 kg/m  as calculated from the following:    Height as of 4/18/19: 1.56 m (5' 1.42\").    Weight as of 4/18/19: 101 kg (222 lb 9.6 oz).     LDA:            JGONZALESG FV AN PLAN NO PONV RULE         PAC Discussion and Assessment    ASA Classification: 2  Case is suitable for: Viola  Anesthetic techniques and relevant risks discussed: GA  Invasive monitoring and risk discussed: No  Types:   Possibility and Risk of blood transfusion discussed: No  NPO instructions given:   Additional anesthetic preparation and risks discussed:   Needs early admission to pre-op area:   Other:     PAC Resident/NP Anesthesia Assessment:  Myra Yang is a 29 year old scheduled for lap sleeve gastrectomy on 5/28/2019 by Dr. Escoto in treatment of morbid obesity.  PAC referral for risk assessment and optimization for anesthesia with comorbid condition of OA of knees, neurofibromatosis 1, autism spectrum disorder, and anxiety:    Pre-operative considerations:  1.  Cardiac:  Functional status- METS 3. Pt endorses she can walk 30 " minutes and can climb one flight of stairs before feeling fatigued.   Intermediate risk surgery with 0.9% (RCRI #) risk of major adverse cardiac event.   2.  Pulm:  Airway feasible.  RENEA risk: low  3.  GI:  Risk of PONV score = 2.  If > 2, anti-emetic intervention recommended.    VTE risk: 0.5%    #Cardiac - Denies CP, SOB, QUILES, palpitations.  No cardiac history.  Self - reported hyperlipidemia, not on any therapies for this.  #Pulmonary - never smoker.  No recent shortness of breath, cough  #Endo - morbid obesity with current BMI of 41.  Will hold phentermine-topiramate for 7 days prior to DOS  #Musculoskeletal - OA of knees  #Psych - anxiety treated with Wellbutrin and Lexapro, will take DOS  #Other - neurofibromatosis 1. Followed for this in [place].  Denies change in vision, seizure history, SOB. Has many cutaneous fibromas.   #Neuro - Autism spectrum disorder    Patient is optimized and is acceptable candidate for the proposed procedure.  No further diagnostic evaluation is needed.     Patient discussed with Dr. Dumont.     **For further details of assessment, testing, and physical exam please see H and P completed on same date.          Sagrario Rios PA-C, [facility]      Reviewed and Signed by PAC Mid-Level Provider/Resident  Mid-Level Provider/Resident: Sagrario Rios  Date: 5/13/2019  Time:     Attending Anesthesiologist Anesthesia Assessment:        Anesthesiologist:   Date:   Time:   Pass/Fail:   Disposition:     PAC Pharmacist Assessment:        Pharmacist:   Date:   Time:        Sagrario Rios PA-C

## 2019-05-13 NOTE — H&P
Pre-Operative H & P     CC:  Preoperative exam to assess for increased cardiopulmonary risk while undergoing surgery and anesthesia.    Date of Encounter: 5/13/2019  Primary Care Physician:  No primary care provider on file.  Associated Diagnosis:  Morbid obesity    HPI  Myra Yang is a 29 year old female who presents for pre-operative H & P in preparation for laparoscopic sleeve gastrectomy with Dr. Escoto on 5/28/2019 at [facility] under general anesthesia.    Ms. Yang is a 29 year old woman with obesity, hyperlipidemia, OA of knees, autism, Neurofibromatosis 1, and anxiety.  She has struggled with her weight for many years.  She has tried watching portions or calories, exercise, Atkins type diet (low carb/high protein), and a physician directed weight loss program without success.  She was referred to [facility] weight management and the above procedure is planned.  She currently denies any seizure history, recent change in vision, SOB. She is followed in [place] for her NF 1.  She has never had GA but has had MAC without complications (per pt, she had EGD in [place] for GERD evaluation in 2014).      History is obtained from the patient.     Past Medical History  Past Medical History:   Diagnosis Date     Autism spectrum 2004     Degenerative arthritis of knee 2002     Generalized anxiety disorder      Neurofibromatosis, type 1 (H) 1991     Social anxiety disorder        Past Surgical History  History reviewed. No pertinent surgical history.    Hx of Blood transfusions/reactions: none     Hx of abnormal bleeding or anti-platelet use: none    Menstrual history: Patient's last menstrual period was 05/04/2019 (exact date).:     Steroid use in the last year: none    Personal or FH with difficulty with Anesthesia:  none    Prior to Admission Medications  Current Outpatient Medications   Medication Sig Dispense Refill     buPROPion  (WELLBUTRIN XL) 300 MG 24 hr tablet Take 300 mg by mouth every morning       cetirizine (ZYRTEC) 10 MG tablet Take 10 mg by mouth daily       Misc Natural Products (GLUCOSAMINE CHONDROITIN ADV PO) Take 1 tablet by mouth daily       Multiple Vitamin (MULTI-VITAMIN DAILY) TABS Take 1 tablet by mouth daily        Phentermine-Topiramate 7.5-46 MG CP24 Take 1 capsule by mouth daily 30 capsule 3     vitamin D3 (CHOLECALCIFEROL) 2000 units tablet Take 2,000 Units by mouth daily       escitalopram (LEXAPRO) 10 MG tablet Take 10 mg by mouth daily       melatonin 5 MG CAPS Take 1 tablet by mouth daily         Allergies  No Known Allergies    Social History  Social History     Socioeconomic History     Marital status: Single     Spouse name: Not on file     Number of children: Not on file     Years of education: Not on file     Highest education level: Not on file   Occupational History     Not on file   Social Needs     Financial resource strain: Not on file     Food insecurity:     Worry: Not on file     Inability: Not on file     Transportation needs:     Medical: Not on file     Non-medical: Not on file   Tobacco Use     Smoking status: Never Smoker     Smokeless tobacco: Never Used   Substance and Sexual Activity     Alcohol use: No     Frequency: Never     Drug use: Never     Sexual activity: Not on file   Lifestyle     Physical activity:     Days per week: Not on file     Minutes per session: Not on file     Stress: Not on file   Relationships     Social connections:     Talks on phone: Not on file     Gets together: Not on file     Attends Taoism service: Not on file     Active member of club or organization: Not on file     Attends meetings of clubs or organizations: Not on file     Relationship status: Not on file     Intimate partner violence:     Fear of current or ex partner: Not on file     Emotionally abused: Not on file     Physically abused: Not on file     Forced sexual activity: Not on file   Other Topics  "Concern     Not on file   Social History Narrative     Not on file       Family History  Family History   Problem Relation Age of Onset     Morbid Obesity Mother      Hypertension Father      Sarcoidosis Father      Neurofibromatosis Father      Obesity Father      Asthma Sister      Other - See Comments Sister      Obesity Sister      Mental Illness Maternal Grandmother      ROS/MED HX   The complete review of systems is negative other than noted in the HPI or here.  ENT/Pulmonary:     (+)RENEA risk factors obese, , . .   (-) tobacco use and recent URI   Neurologic:       Cardiovascular:     (+) Dyslipidemia, ----. : . . . :. . No previous cardiac testing       METS/Exercise Tolerance:  3 - Able to walk 1-2 blocks without stopping   Hematologic:        (-) history of blood clots and History of Transfusion   Musculoskeletal: Comment: Bilateral knees osteoarthritis  (+) arthritis,  -       GI/Hepatic:  - neg GI/hepatic ROS       Renal/Genitourinary:  - ROS Renal section negative       Endo:  - neg endo ROS       Psychiatric: Comment: Autism spectrum disorder    (+) psychiatric history anxiety      Infectious Disease:  - neg infectious disease ROS       Malignancy:      - no malignancy   Other:    (+) No chance of pregnancy no H/O Chronic Pain,no other significant disability               Temp: 97.3  F (36.3  C)   BP: 134/84 Pulse: 84     SpO2: 98 %         223 lbs 0 oz  5' 1.42\"   Body mass index is 41.56 kg/m .       Physical Exam  Constitutional: Awake, alert, cooperative, no apparent distress, and appears stated age.  Eyes: Pupils equal, round and reactive to light, extra ocular muscles intact, sclera clear, conjunctiva normal.  HENT: Normocephalic, oral pharynx with moist mucus membranes, good dentition. No goiter appreciated.   Respiratory: Clear to auscultation bilaterally, no crackles or wheezing.  Cardiovascular: Regular rate and rhythm, normal S1 and S2, and no murmur noted.  Carotids +2, no bruits. No edema. " Palpable pulses to radial  DP and PT arteries.   GI: Normal bowel sounds, soft, non-distended, non-tender, difficult exam due to obese abdomen  Lymph/Hematologic: No cervical lymphadenopathy and no supraclavicular lymphadenopathy.  Genitourinary:  Deferred  Skin: Warm and dry.  No rashes at anticipated surgical site. Many fibromas on exposed skin noted.  Musculoskeletal: Full ROM of neck. There is no redness, warmth, or swelling of the joints. Gross motor strength is normal.    Neurologic: Awake, alert, oriented to name, place and time. Cranial nerves II-XII are grossly intact. Gait is normal.   Neuropsychiatric: Calm, cooperative. Normal affect.     Labs: (personally reviewed)  Lab Results   Component Value Date    WBC 8.1 02/19/2019     Lab Results   Component Value Date    RBC 5.24 02/19/2019     Lab Results   Component Value Date    HGB 12.7 02/19/2019     Lab Results   Component Value Date    HCT 41.4 02/19/2019     Lab Results   Component Value Date    MCV 79 02/19/2019     Lab Results   Component Value Date    MCH 24.2 02/19/2019     Lab Results   Component Value Date    MCHC 30.7 02/19/2019     Lab Results   Component Value Date    RDW 15.2 02/19/2019     Lab Results   Component Value Date     02/19/2019     Last Comprehensive Metabolic Panel:  Sodium   Date Value Ref Range Status   02/19/2019 138 133 - 144 mmol/L Final     Potassium   Date Value Ref Range Status   02/19/2019 4.0 3.4 - 5.3 mmol/L Final     Chloride   Date Value Ref Range Status   02/19/2019 105 94 - 109 mmol/L Final     Carbon Dioxide   Date Value Ref Range Status   02/19/2019 27 20 - 32 mmol/L Final     Anion Gap   Date Value Ref Range Status   02/19/2019 6 3 - 14 mmol/L Final     Glucose   Date Value Ref Range Status   02/19/2019 83 70 - 99 mg/dL Final     Urea Nitrogen   Date Value Ref Range Status   02/19/2019 13 7 - 30 mg/dL Final     Creatinine   Date Value Ref Range Status   02/19/2019 0.50 (L) 0.52 - 1.04 mg/dL Final     GFR  Estimate   Date Value Ref Range Status   02/19/2019 >90 >60 mL/min/[1.73_m2] Final     Comment:     Non  GFR Calc  Starting 12/18/2018, serum creatinine based estimated GFR (eGFR) will be   calculated using the Chronic Kidney Disease Epidemiology Collaboration   (CKD-EPI) equation.       Calcium   Date Value Ref Range Status   02/19/2019 8.1 (L) 8.5 - 10.1 mg/dL Final     Bilirubin Total   Date Value Ref Range Status   02/19/2019 0.3 0.2 - 1.3 mg/dL Final     Alkaline Phosphatase   Date Value Ref Range Status   02/19/2019 87 40 - 150 U/L Final     ALT   Date Value Ref Range Status   02/19/2019 18 0 - 50 U/L Final     AST   Date Value Ref Range Status   02/19/2019 13 0 - 45 U/L Final         EKG:  not indicated          ASSESSMENT and PLAN  Myra Yang is a 29 year old scheduled for lap sleeve gastrectomy on 5/28/2019 by Dr. Escoto in treatment of morbid obesity.  PAC referral for risk assessment and optimization for anesthesia with comorbid condition of OA of knees, neurofibromatosis 1, autism spectrum disorder, and anxiety:    Pre-operative considerations:  1.  Cardiac:  Functional status- METS 3. Pt endorses she can walk 30 minutes and can climb one flight of stairs before feeling fatigued.   Intermediate risk surgery with 0.9% (RCRI #) risk of major adverse cardiac event.   2.  Pulm:  Airway feasible.  RENEA risk: low  3.  GI:  Risk of PONV score = 2.  If > 2, anti-emetic intervention recommended.    VTE risk: 0.5%    #Cardiac - Denies CP, SOB, QUILES, palpitations.  No cardiac history.  Self - reported hyperlipidemia, not on any therapies for this.  #Pulmonary - never smoker.  No recent shortness of breath, cough  #Endo - morbid obesity with current BMI of 41.  Will hold phentermine-topiramate for 7 days prior to DOS  #Musculoskeletal - OA of knees  #Psych - anxiety treated with Wellbutrin and Lexapro, will take DOS  #Other - neurofibromatosis 1. Followed for this in [place].  Denies  change in vision, seizure history, SOB. Has many cutaneous fibromas.   #Neuro - Autism spectrum disorder, denies seizure history    Patient is optimized and is acceptable candidate for the proposed procedure.  No further diagnostic evaluation is needed.    Patient was discussed with Dr Dumont.    Sagrario Rios PA-C  Preoperative Assessment Center  [facility]  Phone: [phone]  Fax: [phone]

## 2019-05-13 NOTE — PATIENT INSTRUCTIONS
Preparing for Your Surgery      Name:  Myra Yang   MRN:  [id number]   :  1990   Today's Date:  2019     Arriving for surgery:  Surgery date:  19  Arrival time:  10:20 am    Please come to:       [facility] [address]  [address] [place]  [address]    - ? parking is available in front of the hospital      -    Please proceed to [address] on the 3rd floor. [phone]?     - ?If you are in need of directions, wheelchair or escort please stop at the Information Desk in the lobby.  Inform the information person that you are here for surgery; a wheelchair and escort to [address] will be provided.?     -  Bring your ID and insurance card.    What can I eat or drink?  -  Follow Bariatric Clinic instructions regarding clear liquid diet.  -  You may have clear liquids (water, apple juice or Gatorade) until 12 midnight, day of surgery.      -  Sips of water only with medication from 12 midnight until 9:20 am, day of surgery.      -  Nothing after 9:20 am, day of surgery.    Which medicines can I take?       -  Do not bring your own medications to the hospital.        -  Follow Bariatric Clinic instructions regarding Ibuprofen. If no instructions given, NO Ibuprofen the day prior to surgery.         -  Hold Phentermine-Topiramate, Multivitamin and Glucosamine Chondroitin 7 days prior to surgery. Last dose 19.       -  Do NOT take these medications in the morning, the day of surgery:  Vitamin D3,     -  Please take these medications the morning of surgery:  Bupropion, Escitalopram, Cetirizine,      Acetaminophen (Tylenol) if needed    How do I prepare myself?  -  Take two showers: one the night before surgery; and one the morning of surgery.         Use Scrubcare or Hibiclens to wash from neck down.  You may use your own shampoo and conditioner. No other hair products.   -  Do NOT use lotion, powder, colognes, deodorant, or antiperspirant the  day of your surgery.  -  Do NOT wear any makeup, fingernail polish or jewelry.    -  Begin using Incentive Spirometer 1 week prior to surgery.  Use 4 times per day, up to 5-10 breaths each time.  Bring Incentive Spirometer to hospital.    Questions or Concerns:  If you have questions or concerns prior to your surgery, call [phone]. (Mon - Fri   8 am- 5:30 pm)  Questions about surgery, contact your Surgeons office.      AFTER YOUR SURGERY  Breathing exercises   Breathing exercises help you recover faster. Take deep breaths and let the air out slowly. This will:     Help you wake up after surgery.    Help prevent complications like pneumonia.  Preventing complications will help you go home sooner.   Nausea and vomiting   You may feel sick to your stomach after surgery; if so, let your nurse know.    Pain control:  After surgery, you may have pain. Our goal is to help you manage your pain. Pain medicine will help you feel comfortable enough to do activities that will help you heal.  These activities may include breathing exercises, walking and physical therapy.   To help your health care team treat your pain we will ask: 1) If you have pain  2) where it is located 3) describe your pain in your words  Methods of pain control include medications given by mouth, vein or by nerve block for some surgeries.  Sequential Compression Device (SCD) or Pneumo Boots:  You may need to wear SCD S on your legs or feet. These are wraps connected to a machine that pumps in air and releases it. The repeated pumping helps prevent blood clots from forming.   Using an Incentive Spirometer    An incentive spirometer is a device that helps you do deep breathing exercises. These exercises expand your lungs, aid in circulation, and help prevent pneumonia. Deep breathing exercises also help you breathe better and improve the function of your lungs by:    Keeping your lungs clear    Strengthening your breathing muscles    Helping prevent  respiratory complications or problems  The incentive spirometer gives you a way to take an active part in your care. A nurse or therapist will teach you breathing exercises. To do these exercises, you will breathe in through your mouth and not your nose. The incentive spirometer only works correctly if you breathe in through your mouth.    Steps to clear lungs  Step 1. Exhale normally. Then, inhale normally.    Relax and breathe out.  Step 2. Place your lips tightly around the mouthpiece.    Make sure the device is upright and not tilted.  Step 3. Inhale as much air as you can through the mouthpiece (don't breath through your nose).    Inhale slowly and deeply.    Hold your breath long enough to keep the balls or disk raised for at least 3 to 5 seconds, or as instructed by your healthcare provider.  Step 4. Repeat the exercise regularly.

## 2019-05-20 ENCOUNTER — TELEPHONE (OUTPATIENT)
Dept: SURGERY | Facility: CLINIC | Age: 29
End: 2019-05-20

## 2019-05-20 NOTE — TELEPHONE ENCOUNTER
Patient called to verify her surgery date and time. Verified. She also would like a  present as her mother will be with her. She wanted to know if her mother could stay in the room overnight with her, number to bed control given and she will contact that department.

## 2019-05-28 ENCOUNTER — OFFICE VISIT (OUTPATIENT)
Dept: INTERPRETER SERVICES | Facility: CLINIC | Age: 29
End: 2019-05-28
Payer: COMMERCIAL

## 2019-05-28 ENCOUNTER — HOSPITAL ENCOUNTER (INPATIENT)
Facility: CLINIC | Age: 29
LOS: 1 days | Discharge: HOME | DRG: 620 | End: 2019-05-29
Attending: SURGERY | Admitting: SURGERY
Payer: COMMERCIAL

## 2019-05-28 ENCOUNTER — ANESTHESIA (OUTPATIENT)
Dept: SURGERY | Facility: CLINIC | Age: 29
DRG: 620 | End: 2019-05-28
Payer: COMMERCIAL

## 2019-05-28 ENCOUNTER — ANESTHESIA EVENT (OUTPATIENT)
Dept: SURGERY | Facility: CLINIC | Age: 29
DRG: 620 | End: 2019-05-28
Payer: COMMERCIAL

## 2019-05-28 DIAGNOSIS — E66.01 MORBID (SEVERE) OBESITY DUE TO EXCESS CALORIES (H): Primary | ICD-10-CM

## 2019-05-28 LAB
ABO + RH BLD: NORMAL
ABO + RH BLD: NORMAL
BLD GP AB SCN SERPL QL: NORMAL
BLOOD BANK CMNT PATIENT-IMP: NORMAL
CREAT SERPL-MCNC: 0.48 MG/DL (ref 0.52–1.04)
GFR SERPL CREATININE-BSD FRML MDRD: >90 ML/MIN/{1.73_M2}
GLUCOSE BLDC GLUCOMTR-MCNC: 75 MG/DL (ref 70–99)
HCG UR QL: NEGATIVE
PLATELET # BLD AUTO: 340 10E9/L (ref 150–450)
SPECIMEN EXP DATE BLD: NORMAL

## 2019-05-28 PROCEDURE — 25000128 H RX IP 250 OP 636: Performed by: SURGERY

## 2019-05-28 PROCEDURE — 25800030 ZZH RX IP 258 OP 636: Performed by: NURSE ANESTHETIST, CERTIFIED REGISTERED

## 2019-05-28 PROCEDURE — 25000125 ZZHC RX 250: Performed by: NURSE ANESTHETIST, CERTIFIED REGISTERED

## 2019-05-28 PROCEDURE — 00000146 ZZHCL STATISTIC GLUCOSE BY METER IP

## 2019-05-28 PROCEDURE — 86850 RBC ANTIBODY SCREEN: CPT | Performed by: ANESTHESIOLOGY

## 2019-05-28 PROCEDURE — 25000131 ZZH RX MED GY IP 250 OP 636 PS 637: Performed by: SURGERY

## 2019-05-28 PROCEDURE — 71000015 ZZH RECOVERY PHASE 1 LEVEL 2 EA ADDTL HR: Performed by: SURGERY

## 2019-05-28 PROCEDURE — 36000064 ZZH SURGERY LEVEL 4 EA 15 ADDTL MIN - UMMC: Performed by: SURGERY

## 2019-05-28 PROCEDURE — 86900 BLOOD TYPING SEROLOGIC ABO: CPT | Performed by: ANESTHESIOLOGY

## 2019-05-28 PROCEDURE — 88305 TISSUE EXAM BY PATHOLOGIST: CPT | Performed by: SURGERY

## 2019-05-28 PROCEDURE — 25000132 ZZH RX MED GY IP 250 OP 250 PS 637: Performed by: SURGERY

## 2019-05-28 PROCEDURE — 36415 COLL VENOUS BLD VENIPUNCTURE: CPT | Performed by: SURGERY

## 2019-05-28 PROCEDURE — 86901 BLOOD TYPING SEROLOGIC RH(D): CPT | Performed by: ANESTHESIOLOGY

## 2019-05-28 PROCEDURE — 37000008 ZZH ANESTHESIA TECHNICAL FEE, 1ST 30 MIN: Performed by: SURGERY

## 2019-05-28 PROCEDURE — 37000009 ZZH ANESTHESIA TECHNICAL FEE, EACH ADDTL 15 MIN: Performed by: SURGERY

## 2019-05-28 PROCEDURE — 25000125 ZZHC RX 250: Performed by: SURGERY

## 2019-05-28 PROCEDURE — 12000001 ZZH R&B MED SURG/OB UMMC

## 2019-05-28 PROCEDURE — T1013 SIGN LANG/ORAL INTERPRETER: HCPCS | Mod: U3

## 2019-05-28 PROCEDURE — 36000062 ZZH SURGERY LEVEL 4 1ST 30 MIN - UMMC: Performed by: SURGERY

## 2019-05-28 PROCEDURE — 25000128 H RX IP 250 OP 636: Performed by: ANESTHESIOLOGY

## 2019-05-28 PROCEDURE — 25000128 H RX IP 250 OP 636: Performed by: NURSE ANESTHETIST, CERTIFIED REGISTERED

## 2019-05-28 PROCEDURE — 27210794 ZZH OR GENERAL SUPPLY STERILE: Performed by: SURGERY

## 2019-05-28 PROCEDURE — 36415 COLL VENOUS BLD VENIPUNCTURE: CPT | Performed by: ANESTHESIOLOGY

## 2019-05-28 PROCEDURE — 25000566 ZZH SEVOFLURANE, EA 15 MIN: Performed by: SURGERY

## 2019-05-28 PROCEDURE — 85049 AUTOMATED PLATELET COUNT: CPT | Performed by: SURGERY

## 2019-05-28 PROCEDURE — 0DB64Z3 EXCISION OF STOMACH, PERCUTANEOUS ENDOSCOPIC APPROACH, VERTICAL: ICD-10-PCS | Performed by: SURGERY

## 2019-05-28 PROCEDURE — 82565 ASSAY OF CREATININE: CPT | Performed by: SURGERY

## 2019-05-28 PROCEDURE — 71000014 ZZH RECOVERY PHASE 1 LEVEL 2 FIRST HR: Performed by: SURGERY

## 2019-05-28 PROCEDURE — 40000170 ZZH STATISTIC PRE-PROCEDURE ASSESSMENT II: Performed by: SURGERY

## 2019-05-28 PROCEDURE — 81025 URINE PREGNANCY TEST: CPT | Performed by: ANESTHESIOLOGY

## 2019-05-28 PROCEDURE — 25000128 H RX IP 250 OP 636: Performed by: PHYSICIAN ASSISTANT

## 2019-05-28 RX ORDER — LIDOCAINE 40 MG/G
CREAM TOPICAL
Status: DISCONTINUED | OUTPATIENT
Start: 2019-05-28 | End: 2019-05-28 | Stop reason: HOSPADM

## 2019-05-28 RX ORDER — HYDROMORPHONE HYDROCHLORIDE 1 MG/ML
.3-.5 INJECTION, SOLUTION INTRAMUSCULAR; INTRAVENOUS; SUBCUTANEOUS EVERY 5 MIN PRN
Status: DISCONTINUED | OUTPATIENT
Start: 2019-05-28 | End: 2019-05-28 | Stop reason: HOSPADM

## 2019-05-28 RX ORDER — ONDANSETRON 4 MG/1
4 TABLET, ORALLY DISINTEGRATING ORAL EVERY 30 MIN PRN
Status: DISCONTINUED | OUTPATIENT
Start: 2019-05-28 | End: 2019-05-28 | Stop reason: HOSPADM

## 2019-05-28 RX ORDER — BUPROPION HYDROCHLORIDE 150 MG/1
300 TABLET ORAL EVERY MORNING
Status: DISCONTINUED | OUTPATIENT
Start: 2019-05-29 | End: 2019-05-28 | Stop reason: ALTCHOICE

## 2019-05-28 RX ORDER — DIPHENHYDRAMINE HCL 25 MG
25 CAPSULE ORAL EVERY 6 HOURS PRN
Status: DISCONTINUED | OUTPATIENT
Start: 2019-05-28 | End: 2019-05-29 | Stop reason: HOSPADM

## 2019-05-28 RX ORDER — FENTANYL CITRATE 50 UG/ML
25-50 INJECTION, SOLUTION INTRAMUSCULAR; INTRAVENOUS
Status: DISCONTINUED | OUTPATIENT
Start: 2019-05-28 | End: 2019-05-28 | Stop reason: HOSPADM

## 2019-05-28 RX ORDER — DIPHENHYDRAMINE HYDROCHLORIDE 50 MG/ML
25 INJECTION INTRAMUSCULAR; INTRAVENOUS EVERY 6 HOURS PRN
Status: DISCONTINUED | OUTPATIENT
Start: 2019-05-28 | End: 2019-05-29 | Stop reason: HOSPADM

## 2019-05-28 RX ORDER — NALOXONE HYDROCHLORIDE 0.4 MG/ML
.1-.4 INJECTION, SOLUTION INTRAMUSCULAR; INTRAVENOUS; SUBCUTANEOUS
Status: DISCONTINUED | OUTPATIENT
Start: 2019-05-28 | End: 2019-05-29

## 2019-05-28 RX ORDER — SODIUM CHLORIDE, SODIUM LACTATE, POTASSIUM CHLORIDE, CALCIUM CHLORIDE 600; 310; 30; 20 MG/100ML; MG/100ML; MG/100ML; MG/100ML
INJECTION, SOLUTION INTRAVENOUS CONTINUOUS
Status: DISCONTINUED | OUTPATIENT
Start: 2019-05-28 | End: 2019-05-29 | Stop reason: HOSPADM

## 2019-05-28 RX ORDER — LIDOCAINE HYDROCHLORIDE 20 MG/ML
INJECTION, SOLUTION INFILTRATION; PERINEURAL PRN
Status: DISCONTINUED | OUTPATIENT
Start: 2019-05-28 | End: 2019-05-28

## 2019-05-28 RX ORDER — BUPIVACAINE HYDROCHLORIDE 5 MG/ML
INJECTION, SOLUTION PERINEURAL PRN
Status: DISCONTINUED | OUTPATIENT
Start: 2019-05-28 | End: 2019-05-28 | Stop reason: HOSPADM

## 2019-05-28 RX ORDER — NALOXONE HYDROCHLORIDE 0.4 MG/ML
.1-.4 INJECTION, SOLUTION INTRAMUSCULAR; INTRAVENOUS; SUBCUTANEOUS
Status: DISCONTINUED | OUTPATIENT
Start: 2019-05-28 | End: 2019-05-29 | Stop reason: HOSPADM

## 2019-05-28 RX ORDER — ONDANSETRON 2 MG/ML
INJECTION INTRAMUSCULAR; INTRAVENOUS PRN
Status: DISCONTINUED | OUTPATIENT
Start: 2019-05-28 | End: 2019-05-28

## 2019-05-28 RX ORDER — BUPROPION HYDROCHLORIDE 75 MG/1
150 TABLET ORAL 2 TIMES DAILY
Status: DISCONTINUED | OUTPATIENT
Start: 2019-05-28 | End: 2019-05-29 | Stop reason: HOSPADM

## 2019-05-28 RX ORDER — SODIUM CHLORIDE, SODIUM LACTATE, POTASSIUM CHLORIDE, CALCIUM CHLORIDE 600; 310; 30; 20 MG/100ML; MG/100ML; MG/100ML; MG/100ML
INJECTION, SOLUTION INTRAVENOUS CONTINUOUS PRN
Status: DISCONTINUED | OUTPATIENT
Start: 2019-05-28 | End: 2019-05-28

## 2019-05-28 RX ORDER — ESCITALOPRAM OXALATE 10 MG/1
10 TABLET ORAL DAILY
Status: DISCONTINUED | OUTPATIENT
Start: 2019-05-29 | End: 2019-05-29 | Stop reason: HOSPADM

## 2019-05-28 RX ORDER — SODIUM CHLORIDE, SODIUM LACTATE, POTASSIUM CHLORIDE, CALCIUM CHLORIDE 600; 310; 30; 20 MG/100ML; MG/100ML; MG/100ML; MG/100ML
INJECTION, SOLUTION INTRAVENOUS CONTINUOUS
Status: DISCONTINUED | OUTPATIENT
Start: 2019-05-28 | End: 2019-05-28 | Stop reason: HOSPADM

## 2019-05-28 RX ORDER — DEXAMETHASONE SODIUM PHOSPHATE 4 MG/ML
INJECTION, SOLUTION INTRA-ARTICULAR; INTRALESIONAL; INTRAMUSCULAR; INTRAVENOUS; SOFT TISSUE PRN
Status: DISCONTINUED | OUTPATIENT
Start: 2019-05-28 | End: 2019-05-28

## 2019-05-28 RX ORDER — HYDROMORPHONE HYDROCHLORIDE 1 MG/ML
0.5 INJECTION, SOLUTION INTRAMUSCULAR; INTRAVENOUS; SUBCUTANEOUS
Status: DISCONTINUED | OUTPATIENT
Start: 2019-05-28 | End: 2019-05-29 | Stop reason: HOSPADM

## 2019-05-28 RX ORDER — FENTANYL CITRATE 50 UG/ML
INJECTION, SOLUTION INTRAMUSCULAR; INTRAVENOUS PRN
Status: DISCONTINUED | OUTPATIENT
Start: 2019-05-28 | End: 2019-05-28

## 2019-05-28 RX ORDER — LIDOCAINE 40 MG/G
CREAM TOPICAL
Status: DISCONTINUED | OUTPATIENT
Start: 2019-05-28 | End: 2019-05-29 | Stop reason: HOSPADM

## 2019-05-28 RX ORDER — HYOSCYAMINE SULFATE 0.125 MG
125 TABLET ORAL EVERY 4 HOURS PRN
Status: DISCONTINUED | OUTPATIENT
Start: 2019-05-28 | End: 2019-05-29 | Stop reason: HOSPADM

## 2019-05-28 RX ORDER — ONDANSETRON 2 MG/ML
4 INJECTION INTRAMUSCULAR; INTRAVENOUS EVERY 6 HOURS PRN
Status: DISCONTINUED | OUTPATIENT
Start: 2019-05-28 | End: 2019-05-29 | Stop reason: HOSPADM

## 2019-05-28 RX ORDER — ONDANSETRON 4 MG/1
4 TABLET, ORALLY DISINTEGRATING ORAL EVERY 6 HOURS PRN
Status: DISCONTINUED | OUTPATIENT
Start: 2019-05-28 | End: 2019-05-29 | Stop reason: HOSPADM

## 2019-05-28 RX ORDER — OXYCODONE HYDROCHLORIDE 5 MG/1
5-10 TABLET ORAL
Status: DISCONTINUED | OUTPATIENT
Start: 2019-05-28 | End: 2019-05-28

## 2019-05-28 RX ORDER — SCOLOPAMINE TRANSDERMAL SYSTEM 1 MG/1
1 PATCH, EXTENDED RELEASE TRANSDERMAL
Status: DISCONTINUED | OUTPATIENT
Start: 2019-05-28 | End: 2019-05-29 | Stop reason: HOSPADM

## 2019-05-28 RX ORDER — CEFAZOLIN SODIUM 1 G/3ML
1 INJECTION, POWDER, FOR SOLUTION INTRAMUSCULAR; INTRAVENOUS SEE ADMIN INSTRUCTIONS
Status: DISCONTINUED | OUTPATIENT
Start: 2019-05-28 | End: 2019-05-28 | Stop reason: HOSPADM

## 2019-05-28 RX ORDER — PROPOFOL 10 MG/ML
INJECTION, EMULSION INTRAVENOUS PRN
Status: DISCONTINUED | OUTPATIENT
Start: 2019-05-28 | End: 2019-05-28

## 2019-05-28 RX ORDER — OXYCODONE HCL 5 MG/5 ML
5-10 SOLUTION, ORAL ORAL
Status: DISCONTINUED | OUTPATIENT
Start: 2019-05-28 | End: 2019-05-29 | Stop reason: HOSPADM

## 2019-05-28 RX ORDER — PROCHLORPERAZINE MALEATE 5 MG
10 TABLET ORAL EVERY 6 HOURS PRN
Status: DISCONTINUED | OUTPATIENT
Start: 2019-05-28 | End: 2019-05-29 | Stop reason: HOSPADM

## 2019-05-28 RX ORDER — ACETAMINOPHEN 325 MG/1
650 TABLET ORAL EVERY 4 HOURS PRN
Status: DISCONTINUED | OUTPATIENT
Start: 2019-05-28 | End: 2019-05-28

## 2019-05-28 RX ORDER — CEFAZOLIN SODIUM 2 G/100ML
2 INJECTION, SOLUTION INTRAVENOUS
Status: DISCONTINUED | OUTPATIENT
Start: 2019-05-28 | End: 2019-05-28 | Stop reason: HOSPADM

## 2019-05-28 RX ORDER — AMOXICILLIN 250 MG
2 CAPSULE ORAL 2 TIMES DAILY
Status: DISCONTINUED | OUTPATIENT
Start: 2019-05-28 | End: 2019-05-29 | Stop reason: HOSPADM

## 2019-05-28 RX ORDER — ONDANSETRON 2 MG/ML
4 INJECTION INTRAMUSCULAR; INTRAVENOUS EVERY 30 MIN PRN
Status: DISCONTINUED | OUTPATIENT
Start: 2019-05-28 | End: 2019-05-28 | Stop reason: HOSPADM

## 2019-05-28 RX ADMIN — FENTANYL CITRATE 25 MCG: 50 INJECTION, SOLUTION INTRAMUSCULAR; INTRAVENOUS at 14:30

## 2019-05-28 RX ADMIN — HYDROMORPHONE HYDROCHLORIDE 0.5 MG: 1 INJECTION, SOLUTION INTRAMUSCULAR; INTRAVENOUS; SUBCUTANEOUS at 13:56

## 2019-05-28 RX ADMIN — SUGAMMADEX 200 MG: 100 INJECTION, SOLUTION INTRAVENOUS at 13:49

## 2019-05-28 RX ADMIN — SCOPALAMINE 1 PATCH: 1 PATCH, EXTENDED RELEASE TRANSDERMAL at 18:49

## 2019-05-28 RX ADMIN — DEXAMETHASONE SODIUM PHOSPHATE 8 MG: 4 INJECTION, SOLUTION INTRA-ARTICULAR; INTRALESIONAL; INTRAMUSCULAR; INTRAVENOUS; SOFT TISSUE at 13:09

## 2019-05-28 RX ADMIN — PROCHLORPERAZINE EDISYLATE 10 MG: 5 INJECTION INTRAMUSCULAR; INTRAVENOUS at 17:51

## 2019-05-28 RX ADMIN — ROCURONIUM BROMIDE 50 MG: 10 INJECTION INTRAVENOUS at 12:51

## 2019-05-28 RX ADMIN — ENOXAPARIN SODIUM 40 MG: 40 INJECTION SUBCUTANEOUS at 11:54

## 2019-05-28 RX ADMIN — HYDROMORPHONE HYDROCHLORIDE 0.5 MG: 1 INJECTION, SOLUTION INTRAMUSCULAR; INTRAVENOUS; SUBCUTANEOUS at 15:08

## 2019-05-28 RX ADMIN — FENTANYL CITRATE 50 MCG: 50 INJECTION, SOLUTION INTRAMUSCULAR; INTRAVENOUS at 14:42

## 2019-05-28 RX ADMIN — ROCURONIUM BROMIDE 20 MG: 10 INJECTION INTRAVENOUS at 13:31

## 2019-05-28 RX ADMIN — MIDAZOLAM 2 MG: 1 INJECTION INTRAMUSCULAR; INTRAVENOUS at 12:41

## 2019-05-28 RX ADMIN — FENTANYL CITRATE 50 MCG: 50 INJECTION, SOLUTION INTRAMUSCULAR; INTRAVENOUS at 13:07

## 2019-05-28 RX ADMIN — HYDROMORPHONE HYDROCHLORIDE 0.5 MG: 1 INJECTION, SOLUTION INTRAMUSCULAR; INTRAVENOUS; SUBCUTANEOUS at 14:56

## 2019-05-28 RX ADMIN — ONDANSETRON 4 MG: 4 TABLET, ORALLY DISINTEGRATING ORAL at 20:10

## 2019-05-28 RX ADMIN — HYDROMORPHONE HYDROCHLORIDE 0.5 MG: 1 INJECTION, SOLUTION INTRAMUSCULAR; INTRAVENOUS; SUBCUTANEOUS at 13:17

## 2019-05-28 RX ADMIN — ONDANSETRON 4 MG: 2 INJECTION INTRAMUSCULAR; INTRAVENOUS at 15:11

## 2019-05-28 RX ADMIN — ONDANSETRON 4 MG: 2 INJECTION INTRAMUSCULAR; INTRAVENOUS at 13:09

## 2019-05-28 RX ADMIN — FENTANYL CITRATE 25 MCG: 50 INJECTION, SOLUTION INTRAMUSCULAR; INTRAVENOUS at 14:35

## 2019-05-28 RX ADMIN — FENTANYL CITRATE 50 MCG: 50 INJECTION, SOLUTION INTRAMUSCULAR; INTRAVENOUS at 12:51

## 2019-05-28 RX ADMIN — OXYCODONE HYDROCHLORIDE 5 MG: 5 SOLUTION ORAL at 20:03

## 2019-05-28 RX ADMIN — LIDOCAINE HYDROCHLORIDE 100 MG: 20 INJECTION, SOLUTION INFILTRATION; PERINEURAL at 12:51

## 2019-05-28 RX ADMIN — SODIUM CHLORIDE, POTASSIUM CHLORIDE, SODIUM LACTATE AND CALCIUM CHLORIDE: 600; 310; 30; 20 INJECTION, SOLUTION INTRAVENOUS at 13:54

## 2019-05-28 RX ADMIN — HYDROMORPHONE HYDROCHLORIDE 0.5 MG: 1 INJECTION, SOLUTION INTRAMUSCULAR; INTRAVENOUS; SUBCUTANEOUS at 16:25

## 2019-05-28 RX ADMIN — PHENYLEPHRINE HYDROCHLORIDE 100 MCG: 10 INJECTION INTRAVENOUS at 13:22

## 2019-05-28 RX ADMIN — SODIUM CHLORIDE, POTASSIUM CHLORIDE, SODIUM LACTATE AND CALCIUM CHLORIDE: 600; 310; 30; 20 INJECTION, SOLUTION INTRAVENOUS at 12:30

## 2019-05-28 RX ADMIN — PROPOFOL 300 MG: 10 INJECTION, EMULSION INTRAVENOUS at 12:51

## 2019-05-28 ASSESSMENT — MIFFLIN-ST. JEOR: SCORE: 1647.38

## 2019-05-28 ASSESSMENT — ACTIVITIES OF DAILY LIVING (ADL)
TOILETING: 0-->INDEPENDENT
AMBULATION: 0-->INDEPENDENT
COGNITION: 0 - NO COGNITION ISSUES REPORTED
DRESS: 0-->INDEPENDENT
PRIOR_FUNCTIONAL_LEVEL_COMMENT: INDEPENDENT
RETIRED_EATING: 0-->INDEPENDENT
SWALLOWING: 0-->SWALLOWS FOODS/LIQUIDS WITHOUT DIFFICULTY
FALL_HISTORY_WITHIN_LAST_SIX_MONTHS: YES
TRANSFERRING: 0-->INDEPENDENT
ADLS_ACUITY_SCORE: 11
RETIRED_COMMUNICATION: 0-->UNDERSTANDS/COMMUNICATES WITHOUT DIFFICULTY
NUMBER_OF_TIMES_PATIENT_HAS_FALLEN_WITHIN_LAST_SIX_MONTHS: 1
BATHING: 0-->INDEPENDENT

## 2019-05-28 ASSESSMENT — LIFESTYLE VARIABLES: TOBACCO_USE: 0

## 2019-05-28 NOTE — ANESTHESIA PREPROCEDURE EVALUATION
Anesthesia Pre-Procedure Evaluation    Patient: Myra Yang   MRN:     [id number] Gender:   female   Age:    29 year old :      1990        Preoperative Diagnosis: Morbid Obesity   Procedure(s):  Laparoscopic Sleeve Gastrectomy     Past Medical History:   Diagnosis Date     Autism spectrum 2004     Degenerative arthritis of knee      Generalized anxiety disorder      Neurofibromatosis, type 1 (H)      Social anxiety disorder       No past surgical history on file.       Anesthesia Evaluation     . Pt has had prior anesthetic. Type: MAC    No history of anesthetic complications          ROS/MED HX    ENT/Pulmonary:     (+)RENEA risk factors obese, , . .   (-) tobacco use and recent URI   Neurologic:       Cardiovascular:     (+) Dyslipidemia, ----. : . . . :. . No previous cardiac testing       METS/Exercise Tolerance:  3 - Able to walk 1-2 blocks without stopping   Hematologic:        (-) history of blood clots and History of Transfusion   Musculoskeletal: Comment: Bilateral knees osteoarthritis  (+) arthritis,  -       GI/Hepatic:  - neg GI/hepatic ROS       Renal/Genitourinary:  - ROS Renal section negative       Endo:  - neg endo ROS       Psychiatric: Comment: Autism spectrum disorder    (+) psychiatric history anxiety      Infectious Disease:  - neg infectious disease ROS       Malignancy:      - no malignancy   Other:    (+) No chance of pregnancy no H/O Chronic Pain,no other significant disability                        PHYSICAL EXAM:   Mental Status/Neuro: A/A/O; Age Appropriate   Airway: Facies: Feasible  Mallampati: I  Mouth/Opening: Full  TM distance: > 6 cm  Neck ROM: Full   Respiratory: Auscultation: CTAB     Resp. Rate: Normal     Resp. Effort: Normal      CV: Rhythm: Regular  Rate: Age appropriate  Heart: Normal Sounds   Comments:      Dental:  Dental Comments: Missing lower right posterior tooth                Lab Results   Component Value Date    WBC 8.1 2019    HGB  "12.7 02/19/2019    HCT 41.4 02/19/2019     02/19/2019     02/19/2019    POTASSIUM 4.0 02/19/2019    CHLORIDE 105 02/19/2019    CO2 27 02/19/2019    BUN 13 02/19/2019    CR 0.50 (L) 02/19/2019    GLC 83 02/19/2019    GLEN 8.1 (L) 02/19/2019    ALBUMIN 3.5 02/19/2019    PROTTOTAL 7.0 02/19/2019    ALT 18 02/19/2019    AST 13 02/19/2019    ALKPHOS 87 02/19/2019    BILITOTAL 0.3 02/19/2019       Preop Vitals  BP Readings from Last 3 Encounters:   05/13/19 134/84   04/19/19 126/84   02/19/19 125/73    Pulse Readings from Last 3 Encounters:   05/13/19 84   04/19/19 80   02/19/19 68      Resp Readings from Last 3 Encounters:   No data found for Resp    SpO2 Readings from Last 3 Encounters:   05/13/19 98%   04/19/19 97%   02/19/19 98%      Temp Readings from Last 1 Encounters:   05/13/19 36.3  C (97.3  F)    Ht Readings from Last 1 Encounters:   05/13/19 1.56 m (5' 1.42\")      Wt Readings from Last 1 Encounters:   05/13/19 101.2 kg (223 lb)    Estimated body mass index is 41.56 kg/m  as calculated from the following:    Height as of 5/13/19: 1.56 m (5' 1.42\").    Weight as of 5/13/19: 101.2 kg (223 lb).     LDA:            Assessment:   ASA SCORE: 3       Documentation: H&P complete; Preop Testing complete; Consents complete   Proceeding: Proceed without further delay  Tobacco Use:  NO Active use of Tobacco/UNKNOWN Tobacco use status     Plan:   Anes. Type:  General   Pre-Induction: Midazolam IV; Acetaminophen PO   Induction:  IV (Standard)   Airway: Oral ETT; CMAC/VL   Access/Monitoring: PIV; 2nd PIV   Maintenance: Balanced   Emergence: Procedure Site   Logistics: Observation/Admission     Postop Pain/Sedation Strategy:  Standard-Options: Opioids PRN     PONV Management:  Adult Risk Factors: Female, Non-Smoker, Postop Opioids     CONSENT: Direct conversation   Plan and risks discussed with: Patient   Blood Products: Consented (ALL Blood Products)                         Geronimo Moulton MD  "

## 2019-05-28 NOTE — ANESTHESIA POSTPROCEDURE EVALUATION
Anesthesia POST Procedure Evaluation    Patient: Myra Yang   MRN:     [id number] Gender:   female   Age:    29 year old :      1990        Preoperative Diagnosis: Morbid Obesity   Procedure(s):  Laparoscopic Sleeve Gastrectomy   Postop Comments: No value filed.       Anesthesia Type:  General  No value filed.    Reportable Event: NO     PAIN: Uncomplicated   Sign Out status: Comfortable, Well controlled pain     PONV: No PONV   Sign Out status:  No Nausea or Vomiting     Neuro/Psych: Uneventful perioperative course   Sign Out Status: Preoperative baseline; Age appropriate mentation     Airway/Resp.: Uneventful perioperative course   Sign Out Status: Non labored breathing, age appropriate RR; Resp. Status within EXPECTED Parameters     CV: Uneventful perioperative course   Sign Out status: Appropriate BP and perfusion indices; Appropriate HR/Rhythm     Disposition:   Sign Out in:  PACU  Disposition:  Phase II; Home  Recovery Course: Uneventful  Follow-Up: Not required           Last Anesthesia Record Vitals:  CRNA VITALS  2019 1332 - 2019 1432      2019             NIBP:  122/76    Pulse:  88          Last PACU Vitals:  Vitals Value Taken Time   /92 2019  3:30 PM   Temp 36.6  C (97.9  F) 2019  3:00 PM   Pulse 71 2019  3:30 PM   Resp 14 2019  3:30 PM   SpO2 95 % 2019  3:39 PM   Temp src     NIBP     Pulse     SpO2     Resp     Temp     Ht Rate     Temp 2     Vitals shown include unvalidated device data.      Electronically Signed By: Han Blair MD, May 28, 2019, 3:39 PM

## 2019-05-28 NOTE — OR NURSING
Pt has done very well. VSS Responding to narcotic pain meds well.. All approriate documentation for discharge obtained.. Awaiting call form 7B

## 2019-05-28 NOTE — ANESTHESIA CARE TRANSFER NOTE
Patient: Myra Yang    Procedure(s):  Laparoscopic Sleeve Gastrectomy    Diagnosis: Morbid Obesity  Diagnosis Additional Information: No value filed.    Anesthesia Type:   No value filed.     Note:  Airway :Room Air  Patient transferred to:PACU  Comments: To PACU, VSS, airway patent, RN at bedside, patient awake and alert.Handoff Report: Identifed the Patient, Identified the Reponsible Provider, Reviewed the pertinent medical history, Discussed the surgical course, Reviewed Intra-OP anesthesia mangement and issues during anesthesia, Set expectations for post-procedure period and Allowed opportunity for questions and acknowledgement of understanding      Vitals: (Last set prior to Anesthesia Care Transfer)    CRNA VITALS  5/28/2019 1332 - 5/28/2019 1411      5/28/2019             Resp Rate (observed):  14                Electronically Signed By: ARIAN Domínguez CRNA  May 28, 2019  2:11 PM

## 2019-05-28 NOTE — PROGRESS NOTES
SPIRITUAL HEALTH SERVICES  [facility]   PRE-SURGERY VISIT    Had pre-surgery visit with pt and her mother.  Provided spiritual support, prayer.   Fred Colindres M.Div (Bill)., [facility]  Staff   Pager [phone]

## 2019-05-28 NOTE — OP NOTE
[facility], [place]    Operative Note    Pre-operative diagnosis: Morbid Obesity   Post-operative diagnosis * No post-op diagnosis entered *   Procedure: Procedure(s):  Laparoscopic Sleeve Gastrectomy   Surgeon: Surgeon(s) and Role:     * Baldemar Escoto MD - Primary   Assistant Surgeon      Anesthesia: Marvin Bean MD; [please note that Dr. Bean was scrubbed and assisted during the operation due to the unavailability of a qualified surgical resident.]  General    Estimated blood loss: Minimal   Drains: None   Specimens: *partial gastrectomy*   Findings: no atypical findings.   Complications: None.   Implants: None.         BOUGIE SIZE: 40 FR  DISTANCE FROM PYLORUS: 10 CM  STAPLE LINE REINFORCEMENT: NO  STAPLE LINE OVERSEW: NO  COMORBIDITIES:   Past Medical History:   Diagnosis Date     Autism spectrum 2004     Degenerative arthritis of knee      Generalized anxiety disorder      Neurofibromatosis, type 1 (H)      Social anxiety disorder        INDICATIONS FOR PROCEDURE  Myra Yang is a 29 year old female who is morbidly obese.  Numerous weight loss attempts without surgery have been without success.     After understanding the risks and benefits of proceeding with a laparoscopic vertical sleeve gastrectomy, she agreed to an operation as outlined by me.    I reviewed the risks of surgery with Myra Yang.    These include, but are not limited to, death, myocardial infarction, pneumonia, urinary tract infection, deep venous thrombosis with or without pulmonary embolus, abdominal infection from bowel injury or abscess, bowel obstruction, wound infection, and bleeding.    More specific risks related to vertical sleeve gastrectomy were detailed at the bariatric informational seminar and include the followin.) leak at the vertical sleeve staple line, 2.) stricture in the sleeve, 3.) nausea, vomiting, and dehydration for several months, 4.)  "adhesions causing bowel obstruction, 5.) rapid weight loss causing a higher rate of gallstone formation during the first 6 months after surgery, 6.) decreased absorption of vitamins because of the reduced stomach size, 7.) weight regain if inappropriate food intake occurs.    The BMI that we are treating this patient for was measured at the initial consultation visit in our bariatric program and it was: 44.6 g/m2 (as calculated just below).      The initial consult height, weight, and BMI are as follows:    Height: 5' 1\"   BARIATRIC WEIGHT TRACKING 4/18/2019   Initial Weight 236 lbs       Our weight loss surgery program requires weight loss prior to bariatric surgery and currently the height, weight, and BMI are as follows:    Height: 154.9 cm (5' 1\"), Weight: 98 kg (216 lb 0.8 oz), and currently the Body mass index is 40.82 kg/m .    Due to the patient's comorbidity conditions of anxiety, and NF in association with elevated body mass index, bariatric surgery has been recommended and is being performed today.    Moreover, as the surgeon performing this procedure, I certify that the following are true in regards to this patient at or prior to the day of surgery:    1. The patient's body mass index (BMI) is or has been greater than or equal to 35 kg/m2.  2. The patient has at least one co-morbidity related to obesity (as outlined above).  3. The patient has been previously unsuccessful with medical treatment for obesity.  Please note that some of this information has been documented as part of a comprehensive pre-bariatric surgery process in the outpatient clinic and is NOT immediately available in the inpatient encounter for this bariatric operation.      OPERATIVE PROCEDURE:     Myra Yang was brought to the operating room and prepared in routine fashion. Under the benefits of general anesthesia, a left upper quadrant Veress needle was inserted and pneumoperitoneum was established using carbon " dioxide gas to a maximum pressure of 15 mmHg. A total of five ports were placed into the abdomen.     A liver retractor was placed through the rightmost port and this provided a view of the upper stomach. The operation was started by dividing the short gastric vessels off the greater curvature of the stomach. This dissection was carried up to the angle of His, and ligasure dissector was used for hemostasis.       A bougie (size noted above) was passed into the stomach and I used 4 blue loads of the [employer] [place] flex linear cutter stapler device to create a vertical sleeve gastrectomy with the bougie as a template. The bougie was removed.    A transabdominal properitoneal anesthetic block was performed using 30 ml 0.5% bupivicaine diluted with 90 ml saline (120 mL total); this was injected using 22gauge spinal needle into the properitoneal planes around the ports and laterally along the anterior axillary line under direct optical guidance. Some of the mixture was used to inject local anesthetic at the skin of each incision as well.    The sleeve gastrectomy specimen (partial gastrectomy) was now removed from the abdomen through the 15 mm port.    Hemostasis was secured, and the liver retractor and all ports were removed from the abdomen under direct visualization.     All needle and sponge counts were correct x2 at the end of the operation, and I was present for all critical components of the procedure.     Skin incisions were closed using skin staples, and sterile dressings were placed.     I was present for all critical components of the operation and all needle and sponge counts were correct x2 at the end of the procedure.    Baldemar Escoto MD  Surgery  [phone] (hospital )

## 2019-05-28 NOTE — PHARMACY
Bariatric Consult    Medications evaluated as requested per Bariatric Consult. Patient may swallow tablets/capsules ONLY if less than   inch.  Larger tablets/capsules should be broken, crushed or split.    The following changes have been made based on the Bariatric Medication Management Policy. Bupropion XL 300mg daily changed to bupropion 150mg BID. Phentermine/topiramate XR discontinued (can not have extended release formulations post bariatric surgery, nonformulary med, and pre-surgery med)    The pharmacist will continue to follow as new medications are ordered.    Jina Moreland RPH on 5/28/2019 at 5:49 PM

## 2019-05-29 VITALS
TEMPERATURE: 98.4 F | HEART RATE: 65 BPM | SYSTOLIC BLOOD PRESSURE: 138 MMHG | OXYGEN SATURATION: 98 % | HEIGHT: 61 IN | BODY MASS INDEX: 40.79 KG/M2 | WEIGHT: 216.05 LBS | DIASTOLIC BLOOD PRESSURE: 83 MMHG | RESPIRATION RATE: 16 BRPM

## 2019-05-29 LAB — GLUCOSE BLDC GLUCOMTR-MCNC: 103 MG/DL (ref 70–99)

## 2019-05-29 PROCEDURE — 00000146 ZZHCL STATISTIC GLUCOSE BY METER IP

## 2019-05-29 PROCEDURE — 25000131 ZZH RX MED GY IP 250 OP 636 PS 637: Performed by: SURGERY

## 2019-05-29 PROCEDURE — 25000132 ZZH RX MED GY IP 250 OP 250 PS 637: Performed by: SURGERY

## 2019-05-29 PROCEDURE — 25000128 H RX IP 250 OP 636: Performed by: SURGERY

## 2019-05-29 PROCEDURE — 25800030 ZZH RX IP 258 OP 636: Performed by: SURGERY

## 2019-05-29 RX ORDER — AMOXICILLIN 250 MG
2 CAPSULE ORAL 2 TIMES DAILY
Qty: 56 TABLET | Refills: 0 | Status: SHIPPED | OUTPATIENT
Start: 2019-05-29 | End: 2019-07-02

## 2019-05-29 RX ORDER — OXYCODONE HYDROCHLORIDE 5 MG/1
5 TABLET ORAL EVERY 6 HOURS PRN
Qty: 10 TABLET | Refills: 0 | Status: SHIPPED | OUTPATIENT
Start: 2019-05-29 | End: 2019-07-02

## 2019-05-29 RX ORDER — HYOSCYAMINE SULFATE 0.125 MG
125 TABLET ORAL EVERY 4 HOURS PRN
Qty: 28 TABLET | Refills: 0 | Status: SHIPPED | OUTPATIENT
Start: 2019-05-29 | End: 2019-07-18

## 2019-05-29 RX ORDER — ACETAMINOPHEN 325 MG/1
325-650 TABLET ORAL EVERY 6 HOURS PRN
Qty: 56 TABLET | Refills: 0 | Status: SHIPPED | OUTPATIENT
Start: 2019-05-29

## 2019-05-29 RX ORDER — ONDANSETRON 4 MG/1
4 TABLET, ORALLY DISINTEGRATING ORAL EVERY 6 HOURS PRN
Qty: 14 TABLET | Refills: 0 | Status: SHIPPED | OUTPATIENT
Start: 2019-05-29 | End: 2019-07-02

## 2019-05-29 RX ADMIN — ONDANSETRON 4 MG: 4 TABLET, ORALLY DISINTEGRATING ORAL at 11:10

## 2019-05-29 RX ADMIN — OMEPRAZOLE 20 MG: 20 CAPSULE, DELAYED RELEASE ORAL at 08:58

## 2019-05-29 RX ADMIN — ENOXAPARIN SODIUM 40 MG: 40 INJECTION SUBCUTANEOUS at 05:11

## 2019-05-29 RX ADMIN — PROCHLORPERAZINE EDISYLATE 10 MG: 5 INJECTION INTRAMUSCULAR; INTRAVENOUS at 05:22

## 2019-05-29 RX ADMIN — PROCHLORPERAZINE EDISYLATE 10 MG: 5 INJECTION INTRAMUSCULAR; INTRAVENOUS at 00:39

## 2019-05-29 RX ADMIN — ESCITALOPRAM OXALATE 10 MG: 10 TABLET ORAL at 13:58

## 2019-05-29 RX ADMIN — BUPROPION HYDROCHLORIDE 150 MG: 75 TABLET, FILM COATED ORAL at 15:05

## 2019-05-29 RX ADMIN — SODIUM CHLORIDE, POTASSIUM CHLORIDE, SODIUM LACTATE AND CALCIUM CHLORIDE: 600; 310; 30; 20 INJECTION, SOLUTION INTRAVENOUS at 04:09

## 2019-05-29 RX ADMIN — HYDROMORPHONE HYDROCHLORIDE 0.5 MG: 1 INJECTION, SOLUTION INTRAMUSCULAR; INTRAVENOUS; SUBCUTANEOUS at 00:40

## 2019-05-29 RX ADMIN — ONDANSETRON 4 MG: 2 INJECTION INTRAMUSCULAR; INTRAVENOUS at 04:07

## 2019-05-29 ASSESSMENT — ACTIVITIES OF DAILY LIVING (ADL)
ADLS_ACUITY_SCORE: 11

## 2019-05-29 NOTE — DISCHARGE SUMMARY
"General acute hospital   MIS Discharge Summary     Date of Admission: 5/28/2019  Date of Discharge: 5/29/2019     Admission Diagnosis:  1. Morbid obesity     Discharge Diagnosis:  1. Same as above     Consultations:  None     Procedures:  1. Laparoscopic Sleeve Gastrectomy by Dr Escoto on 5/28/19     Brief HPI:  From pre-op H&P:    \"Myra Yang is a 29 year old female who presents for pre-operative H & P in preparation for laparoscopic sleeve gastrectomy with Dr. Escoto on 5/28/2019 at [facility] under general anesthesia.     Ms. Yang is a 29 year old woman with obesity, hyperlipidemia, OA of knees, autism, Neurofibromatosis 1, and anxiety.  She has struggled with her weight for many years.  She has tried watching portions or calories, exercise, Atkins type diet (low carb/high protein), and a physician directed weight loss program without success.  She was referred to [facility] weight management and the above procedure is planned.  She currently denies any seizure history, recent change in vision, SOB. She is followed in [place] for her NF 1.  She has never had GA but has had MAC without complications (per pt, she had EGD in [place] for GERD evaluation in 2014).\"    Hospital Course:  The patient was admitted and underwent the above procedure. The patient tolerated the procedure well. There were no complications. The patient's diet was advanced to bariatric full liquid diet. Pain was controlled with oral pain medication and the patient was able to ambulate and void without difficulty. The patient received appropriate education post operatively. On POD #1 the patient was discharged to home.    Discharge Physical Exam:  /74 (BP Location: Left arm)   Pulse 65   Temp 98.3  F (36.8  C) (Oral)   Resp 16   Ht 1.549 m (5' 1\")   Wt 98 kg (216 lb 0.8 oz)   LMP 05/04/2019 (Exact Date)   SpO2 97%   BMI 40.82 kg/m  "     Gen: NAD  Lungs: non-labored breathing  CV: regular rhythm, normal rate   Abd: obese, soft, nondistended, appropriately tender, incisions are c/d/i  Ext: no peripheral edema  Neuro: AOx3    Meds:       Review of your medicines      START taking      Dose / Directions   acetaminophen 325 MG tablet  Commonly known as:  TYLENOL      Dose:  325-650 mg  Take 1-2 tablets (325-650 mg) by mouth every 6 hours as needed for mild pain  Quantity:  56 tablet  Refills:  0     hyoscyamine 0.125 MG tablet  Commonly known as:  ANASPAZ/LEVSIN      Dose:  125 mcg  Take 1 tablet (125 mcg) by mouth every 4 hours as needed for cramping (spasms)  Quantity:  28 tablet  Refills:  0     omeprazole 20 MG DR capsule  Commonly known as:  priLOSEC      Dose:  20 mg  Take 1 capsule (20 mg) by mouth every morning (before breakfast) for 28 days  Quantity:  28 capsule  Refills:  0     ondansetron 4 MG ODT tab  Commonly known as:  ZOFRAN-ODT      Dose:  4 mg  Take 1 tablet (4 mg) by mouth every 6 hours as needed for nausea or vomiting  Quantity:  14 tablet  Refills:  0     oxyCODONE 5 MG tablet  Commonly known as:  ROXICODONE      Dose:  5 mg  Take 1 tablet (5 mg) by mouth every 6 hours as needed for severe pain  Quantity:  10 tablet  Refills:  0     senna-docusate 8.6-50 MG tablet  Commonly known as:  SENOKOT-S/PERICOLACE      Dose:  2 tablet  Take 2 tablets by mouth 2 times daily for 14 days  Quantity:  56 tablet  Refills:  0        CONTINUE these medicines which have NOT CHANGED      Dose / Directions   buPROPion 300 MG 24 hr tablet  Commonly known as:  WELLBUTRIN XL      Dose:  300 mg  Take 300 mg by mouth every morning  Refills:  0     cetirizine 10 MG tablet  Commonly known as:  zyrTEC      Dose:  10 mg  Take 10 mg by mouth daily  Refills:  0     escitalopram 10 MG tablet  Commonly known as:  LEXAPRO      Dose:  10 mg  Take 10 mg by mouth daily  Refills:  0     GLUCOSAMINE CHONDROITIN ADV PO      Dose:  1 tablet  Take 1 tablet by mouth  daily  Refills:  0     melatonin 5 MG Caps      Dose:  1 tablet  Take 1 tablet by mouth daily  Refills:  0     MULTI-VITAMIN DAILY Tabs      Dose:  1 tablet  Take 1 tablet by mouth daily  Refills:  0     Phentermine-Topiramate 7.5-46 MG Cp24  Used for:  Obesity, Class III, BMI 40-49.9 (morbid obesity) (H)      Dose:  1 capsule  Take 1 capsule by mouth daily  Quantity:  30 capsule  Refills:  3     vitamin D3 2000 units (50 mcg) tablet  Commonly known as:  CHOLECALCIFEROL      Dose:  2000 Units  Take 2,000 Units by mouth daily  Refills:  0           Where to get your medicines      These medications were sent to [facility] - [place], [place] - [address]  [address] [address]    Phone:  [phone]     acetaminophen 325 MG tablet    hyoscyamine 0.125 MG tablet    omeprazole 20 MG DR capsule    ondansetron 4 MG ODT tab    senna-docusate 8.6-50 MG tablet     Some of these will need a paper prescription and others can be bought over the counter. Ask your nurse if you have questions.    Bring a paper prescription for each of these medications    oxyCODONE 5 MG tablet         Additional instructions:  After Care     Future Labs/Procedures    Activity     Comments:    Your activity upon discharge: activity as tolerated    Diet     Comments:    Follow this diet upon discharge: Orders Placed This Encounter      Bariatric Diet Full Liquids    Discharge Instructions     Comments:    Diet on discharge: post-op diet is bariatric clear liquids for 1 DAY and then advance to full liquids two days after surgery.    No lifting >10 pounds for 4-6 weeks. Discuss with your surgeon at follow up appointment  May shower starting postoperative day #1 but no scrubbing incisions. No bathing or soaking incisions for 2 weeks or until incisions completely healed.  Wound care: Keep clean and dry. Steri strips or glue will fall off on their own.   Take stool softener while taking narcotic pain  medication.  No driving for at least 12 hours after taking narcotic pain medication.  After surgery it is ok to swallow medications smaller than 1/4 inch(size of pencil eraser) for all procedures.  If medication is larger than 1/4 inch then it will need to be crushed, cut or in liquid form for 1-2 months after surgery. This can be discussed with surgeon team at the 1 month follow up appointment and will depend on patient tolerance.  You will be sent home with omeprazole 20mg once daily for heartburn symptom prevention, zofran as needed for nausea and a medication called hyoscyamine (levsin) as needed for cramping.  Follow-up with your surgeon team in 1-2 weeks. If this appointment was not previously made for you please call:  BARIATRIC PATIENTS:  Call [phone] to schedule or speak with a nurse.  If you are experiencing increasing abdominal pain, nausea, vomiting, increasing drainage from your wounds, chills, or fever >101.5 and unable to reach a nurse call [phone] and ask for on call surgery resident.    You will receive a call from our clinic nurse after surgery.      AFTER HOURS QUESTIONS OR CONCERNS: Call [phone] and ask to speak with surgery resident if you are having troubles in the evenings, at night, or on weekends. Please call if you experience increasing abdominal pain, nausea, vomiting, increasing drainage from your wounds, chills, or fever >101.5  Appointments located at [facility] clinic:  [facility] ([facility] [place]    [address]  [place] [address]              Follow Up:  -Follow up in Bariatric Surgery clinic 1 week(s) after discharge.       BARIATRIC PATIENTS:  Call [phone] to schedule or to reach your care team

## 2019-05-29 NOTE — PLAN OF CARE
"/70 (BP Location: Left arm)   Pulse 65   Temp 98  F (36.7  C) (Oral)   Resp 16   Ht 1.549 m (5' 1\")   Wt 98 kg (216 lb 0.8 oz)   LMP 05/04/2019 (Exact Date)   SpO2 96%   BMI 40.82 kg/m      [id number]: POD#0 from lap sleeve gastrectomy; arrived to [address] around 1730. Five abdominal lap sites covered with primapore dressings; one mid-left with scant drainage marked. Pain controlled; given 5 mg oxycodone x1. Nauseated; Compazine 10 mg IV x1, Zofran ODT x1, Scopolamine patch placed behind L ear. Intermittently tolerating bariatric clear liquid diet 30 ml every 30 min; plan to advance to full liquids in the morning if nausea has improved. Feeling a bit bloated; activity encouraged and instructed pt to slow down on the clears a bit. R PIV infusing LR at 75ml/hr. Last BM yesterday; held senokot tonight. Flatus negative. Voided upon arrival 500 ml clear straw-yellow urine. Ambulated from PACU stretcher outside room doorway to bathroom, then to bed without complications. Walked in halls later in shift as well. SBA. Afebrile. VSS on RA. Refused capnography; FYI'd Dr. Mili Harp. A&Ox4. Mother at bedside; attentive and supportive to patient. Continue to monitor and follow POC.  "

## 2019-05-29 NOTE — PROGRESS NOTES
Surgery Post-Op Check  05/28/2019 7:26 PM  Patient: Myra Yang  MRN: [id number]    Subjective  Patient is POD#0 from lap sleeve gastrectomy. She currently feels well and has been tolerating bariatric clears, 30cc q30 min. She denies nausea/vomiting/chest pain/SOB. Her pain is well controlled. She is anxious to get out of bed and walk.    Objective  Temp:  [97.9  F (36.6  C)-99  F (37.2  C)] 98  F (36.7  C)  Pulse:  [65-88] 65  Heart Rate:  [64-84] 66  Resp:  [14-18] 16  BP: (119-142)/(66-96) 123/72  SpO2:  [92 %-99 %] 92 %     I/O last 3 completed shifts:  In: 1000 [I.V.:1000]  Out: -     Physical Exam:  General: Patient is alert, oriented, and in no acute distress. Resting comfortably in bed.  CV: RRR, well-perfused  Pulm: Breathing comfortably on room air.   Abd: obese, soft, nontender, nondistended, laparoscopic incisions c/d/i.  Extremities: warm, well-perfused without edema  Neuro: No focal deficits noted, patient moves all extremities spontaneously    Assessment/Plan  Myra Yang is a 29 year old female POD#0 s/p laparoscopic sleeve gastrectomy. Patient recovering as expected.    Continue current postoperative cares.    Mili Harp MD  PGY-1 Surgery  Pager [phone]

## 2019-05-29 NOTE — PLAN OF CARE
Continued to be nauseated this AM with a small emesis. Zofran ODT given with relief. Pt started feeling better and was able to take one medication at a time. No more nausea. Pt took two long walks and tolerated well. Taking small amounts of bariatric full diet. Voiding in good amounts. Lap sites x5 clean, dry and intact. Mother at bedside and very supportive to patient and helpful to staff. Discharged to home and evening nurse to complete discharge process.

## 2019-05-29 NOTE — PLAN OF CARE
Patient denies SOB, clear LS, stable on RA. +BS, (-) flatus, c/o nausea, had few episodes of small emesis- given Zofran and Compazine with little relief, scopolamine patch in place behind left ear. Dilaudid 0.5 mg iv given 1x for incisional pain, abdominal 5x lap sites cdi. Voiding not saving. BG POD1- 103. LR at 75 ml/hr via right PIV. Continue poc.   Vitals:    05/28/19 2140 05/28/19 2143 05/28/19 2230 05/29/19 0322   BP: 134/72  132/70 122/72   BP Location: Left arm  Left arm Left arm   Pulse:       Resp: 16  16 16   Temp:  97.6  F (36.4  C) 98  F (36.7  C) 98.1  F (36.7  C)   TempSrc:  Oral Oral Oral   SpO2: 96%  96% 96%   Weight:       Height:

## 2019-05-31 ENCOUNTER — TELEPHONE (OUTPATIENT)
Dept: SURGERY | Facility: CLINIC | Age: 29
End: 2019-05-31

## 2019-05-31 LAB — COPATH REPORT: NORMAL

## 2019-05-31 NOTE — TELEPHONE ENCOUNTER
Attempted to contact patient for post op follow up and neither of the numbers give a dial tone or ring at all. Both busy signals.

## 2019-06-06 ENCOUNTER — OFFICE VISIT (OUTPATIENT)
Dept: SURGERY | Facility: CLINIC | Age: 29
End: 2019-06-06
Payer: COMMERCIAL

## 2019-06-06 VITALS
SYSTOLIC BLOOD PRESSURE: 117 MMHG | OXYGEN SATURATION: 97 % | WEIGHT: 207.7 LBS | TEMPERATURE: 98.4 F | HEIGHT: 61 IN | DIASTOLIC BLOOD PRESSURE: 76 MMHG | HEART RATE: 78 BPM | BODY MASS INDEX: 39.21 KG/M2

## 2019-06-06 DIAGNOSIS — Z98.84 BARIATRIC SURGERY STATUS: Primary | ICD-10-CM

## 2019-06-06 RX ORDER — URSODIOL 300 MG/1
300 CAPSULE ORAL 2 TIMES DAILY
Qty: 60 CAPSULE | Refills: 4 | Status: SHIPPED | OUTPATIENT
Start: 2019-06-06

## 2019-06-06 ASSESSMENT — MIFFLIN-ST. JEOR: SCORE: 1609.24

## 2019-06-06 ASSESSMENT — PAIN SCALES - GENERAL: PAINLEVEL: NO PAIN (0)

## 2019-06-06 NOTE — PATIENT INSTRUCTIONS
Goals:  1) Follow bariatric low-fat full liquid diet through day 13 post-op, then to progress to pureed diet x 2 weeks.  If tolerating, may advance on day 29 post-op to bariatric soft diet.   2) Work towards 60 gm protein/day.  3) Consume 48-64 oz fluids daily- between meals.  4) Eat slowly (>20 min/meal), chewing well to smooth consistency once on the bariatric soft diet.  5) Limit portions to 1/2 cup/meal.  6) Start chewable/liquid multivitamin/minerals twice daily.  7) Bring in your vitamin/supplement     Follow up with RD in 3 weeks     Lesley Carranza, MS, RD, LD  If you need to schedule or reschedule with a dietitian please call [phone].

## 2019-06-06 NOTE — PATIENT INSTRUCTIONS
It was a pleasure meeting with you today.     Thank you for allowing us the privilege of caring for you. We hope we provided you with the excellent service you deserve.     Please let us know if there is anything else we can do for you so that we can be sure you are leaving completely satisfied with your care experience.      You saw ARIAN Pisano CNP today.     Instructions per today's visit:   Continue omeprazole for 1 month- okay to not open it  Continue to hold qsymia (phentermine/ topiramate)  Start actigall for gallbladder- will take for 6 months  Continue to work on water   Follow up in 3 weeks       To schedule appointments with our team, please call [phone] option #1    Please call during clinic hours Monday through Friday 8:00a - 4:00p if you have questions or you can contact us via [employer] at anytime.      Nurses: [phone]  Fax: [phone]  Surgery Scheduler: [phone]    Please call the hospital at [phone] to speak with our on call MDs if you have urgent needs after hours, during weekends, or holidays.    **Please note if you need to go to the Emergency Room for any reason in the first 90 days after surgery it is important to go to the [facility] ER on the [place] on [address] off of the [address] off [address]    Lab results will be communicated through My Chart or letter (if My Chart not used). Please call the clinic if you have not received communication after 1 week or if you have any questions.?      Main follow-up parameters for all bariatric patients     Patients who have undergone Bariatric surgery:    1. Follow-up interval during the first year: ~1 week, 1 month, 3 month, 6 month,12 months.  Every 6 months until 5 years; and then annually thereafter.  The goal of follow-up sessions is to ensure that food intake behavior (choice of food and eating speed) and exercise/activity level are set appropriately.    2. Yearly lab tests ordered by our clinic.      3.  The bariatric team should be aware and potentially evaluate all adverse gastrointestinal symptoms (dysphagia, abdominal pain, nausea, vomiting, diarrhea, heartburn, reflux, etc), which can be a sign of complication.  The bariatric surgeons perform general surgery procedures in addition to bariatric surgery (laparoscopic cholecystectomy, etc.)    4. Inability to tolerate textured food (chicken, steak, fish, eggs, beans) is NOT normal and may need to be evaluated by endoscopy performed by the bariatric surgeon.

## 2019-06-06 NOTE — NURSING NOTE
"(   Chief Complaint   Patient presents with     Surgical Followup     S/P LSG 5/28/19.    )    ( Weight: 94.2 kg (207 lb 11.2 oz) )  ( Height: 154.9 cm (5' 0.98\") )  ( BMI (Calculated): 39.26 )  ( Initial Weight: 107 kg (236 lb) )  ( Cumulative weight loss (lbs): 28.3 )  ( Last Visits Weight: 98 kg (216 lb 0.8 oz) )  ( Wt change since last visit (lbs): -8.35 )  (   )  (   )    ( BP: 117/76 )  (   )  ( Temp: 98.4  F (36.9  C) )  ( Temp src: Oral )  ( Pulse: 78 )  (   )  ( SpO2: 97 % )    (   Patient Active Problem List   Diagnosis     Morbid (severe) obesity due to excess calories (H)    )  (   Current Outpatient Medications   Medication Sig Dispense Refill     acetaminophen (TYLENOL) 325 MG tablet Take 1-2 tablets (325-650 mg) by mouth every 6 hours as needed for mild pain 56 tablet 0     buPROPion (WELLBUTRIN XL) 300 MG 24 hr tablet Take 300 mg by mouth every morning       cetirizine (ZYRTEC) 10 MG tablet Take 10 mg by mouth daily       escitalopram (LEXAPRO) 10 MG tablet Take 10 mg by mouth daily       hyoscyamine (ANASPAZ/LEVSIN) 0.125 MG tablet Take 1 tablet (125 mcg) by mouth every 4 hours as needed for cramping (spasms) 28 tablet 0     melatonin 5 MG CAPS Take 1 tablet by mouth daily       Misc Natural Products (GLUCOSAMINE CHONDROITIN ADV PO) Take 1 tablet by mouth daily       Multiple Vitamin (MULTI-VITAMIN DAILY) TABS Take 1 tablet by mouth daily        omeprazole (PRILOSEC) 20 MG DR capsule Take 1 capsule (20 mg) by mouth every morning (before breakfast) for 28 days 28 capsule 0     Phentermine-Topiramate 7.5-46 MG CP24 Take 1 capsule by mouth daily 30 capsule 3     vitamin D3 (CHOLECALCIFEROL) 2000 units tablet Take 2,000 Units by mouth daily       ondansetron (ZOFRAN-ODT) 4 MG ODT tab Take 1 tablet (4 mg) by mouth every 6 hours as needed for nausea or vomiting (Patient not taking: Reported on 6/6/2019) 14 tablet 0     oxyCODONE (ROXICODONE) 5 MG tablet Take 1 tablet (5 mg) by mouth every 6 hours as " needed for severe pain (Patient not taking: Reported on 6/6/2019) 10 tablet 0     senna-docusate (SENOKOT-S/PERICOLACE) 8.6-50 MG tablet Take 2 tablets by mouth 2 times daily for 14 days (Patient not taking: Reported on 6/6/2019) 56 tablet 0    )  ( Diabetes Eval:    )    ( Pain Eval:  No Pain (0) )    ( Wound Eval:       )    (   History   Smoking Status     Never Smoker   Smokeless Tobacco     Never Used    )    ( Signed By:  Amanda Cervantes; June 6, 2019; 10:33 AM )

## 2019-06-06 NOTE — LETTER
"6/6/2019       RE: Myra Yang  [address]  [place] [address]     Dear Colleague,    Thank you for referring your patient, Myra Yang, to the [facility] SURGICAL WEIGHT MANAGEMENT at [facility]. Please see a copy of my visit note below.    Postoperative bariatric surgery visit.    Patient underwent sleeve gastrectomy 1 week ago. Dr. Escoto 5/28      Tolerating liquids: yes, not measuring   Lightheadedness: none  Abdominal pain: mild cramping, not taking pain medication   Bowel movements: normal   Fevers/shakes/chills: none  GERD: none Taking omeprazole once daily   Leg/calf pain: none    /76 (BP Location: Left arm, Patient Position: Sitting, Cuff Size: Adult Large)   Pulse 78   Temp 98.4  F (36.9  C) (Oral)   Ht 1.549 m (5' 0.98\")   Wt 94.2 kg (207 lb 11.2 oz)   SpO2 97%   BMI 39.27 kg/m     NAD  Overall looks great  Incisions c/d/i; soft, non-tender    Plan:  1. RD visit today.  2. Start vitamin supplements per RD directions.  3. Advance diet per RD directions.  4. Follow-up: 3 weeks  5. Actigall prescription sent 6/6/19  6. B12 sl taking already   7. Pathology reviewed 6/6/19 normal  8. Hold ARIAN Bansal CNP     "

## 2019-06-06 NOTE — PROGRESS NOTES
"Postoperative bariatric surgery visit.    Patient underwent sleeve gastrectomy 1 week ago. Dr. Escoto 5/28      Tolerating liquids: yes, not measuring   Lightheadedness: none  Abdominal pain: mild cramping, not taking pain medication   Bowel movements: normal   Fevers/shakes/chills: none  GERD: none Taking omeprazole once daily   Leg/calf pain: none    /76 (BP Location: Left arm, Patient Position: Sitting, Cuff Size: Adult Large)   Pulse 78   Temp 98.4  F (36.9  C) (Oral)   Ht 1.549 m (5' 0.98\")   Wt 94.2 kg (207 lb 11.2 oz)   SpO2 97%   BMI 39.27 kg/m    NAD  Overall looks great  Incisions c/d/i; soft, non-tender    Plan:  1. RD visit today.  2. Start vitamin supplements per RD directions.  3. Advance diet per RD directions.  4. Follow-up: 3 weeks  5. Actigall prescription sent 6/6/19  6. B12 sl taking already   7. Pathology reviewed 6/6/19 normal  8. Hold marva Michael APRN CNP   "

## 2019-06-06 NOTE — PROGRESS NOTES
Nutrition Assessment  Reason For Visit:  Myra Yang is a 29 year old female presenting today for nutrition follow-up, 1 week s/p sleeve gastrectomy with Dr. Escoto (5/28/19). Pt referred by Anamaria Michael NP (6/6/19).     Anthropometrics  Initial Consult Weight: 236 lbs  Day of Surgery Weight: 216 lbs  Current Weight: 207.7 lbs  Weight loss: 28.3 lbs from initial consult; 8.3 lbs from day of surgery    Current Vitamins/Minerals: Multivitamin, vitamin D, B12 and B-complex.   -Pt states that she is tolerating all of her vitamins even he vitamin D and B12 which are not chewable    Nutrition History  Pt reports consuming and tolerating bariatric clear and low-fat full liquid diets. Fluid intake appears adequate, consuming 48-64 oz/day.  Liquids: premier protein, water with protein.   Pt states that she has been having some baby food fruits.    Nutrition Prescription:  Grams Protein: 60 (minimum)  Amount of Fluid: 48-64 oz    Nutrition Diagnosis  Food and nutrition-related knowledge deficit r/t lack of prior exposure to diet advancements beyond bariatric low-fat full liquid diet aeb pt unable to verbalize understanding of bariatric pureed and soft diets.    Intervention  Intervention At Appointment:  Materials/education provided on bariatric pureed and soft diets, protein intake, fluid intake, eating pace, portion control, avoiding excess sugar and fat, recommended vitamin/mineral supplements.  -Encouraged patient to follow the diet guidelines to help aid in maximum weight loss.   -Answered all questions at this time.   -Gave AVS and RD contact information.    Patient Understanding: Good  Expected Compliance: Good    Goals:  1) Follow bariatric low-fat full liquid diet through day 13 post-op, then to progress to pureed diet x 2 weeks.  If tolerating, may advance on day 29 post-op to bariatric soft diet.   2) Work towards 60 gm protein/day.  3) Consume 48-64 oz fluids daily- between meals.  4) Eat slowly (>20  min/meal), chewing well to smooth consistency once on the bariatric soft diet.  5) Limit portions to 1/2 cup/meal.  6) Start chewable/liquid multivitamin/minerals twice daily.  7) Bring in your vitamin/supplement     Follow-Up: 3 weeks or prn    Time spent with patient: 15 minutes.  Lesley Carranza, MS, RD, LD

## 2019-06-06 NOTE — LETTER
6/6/2019       RE: Myra Yang  [address]  [place] [address]     Dear Colleague,    Thank you for referring your patient, Myra Yang, to the [facility] SURGICAL WEIGHT MANAGEMENT at [facility]. Please see a copy of my visit note below.    Nutrition Assessment  Reason For Visit:  Myra Yang is a 29 year old female presenting today for nutrition follow-up, 1 week s/p sleeve gastrectomy with Dr. Escoto (5/28/19). Pt referred by Anamaria Michael NP (6/6/19).     Anthropometrics  Initial Consult Weight: 236 lbs  Day of Surgery Weight: 216 lbs  Current Weight: 207.7 lbs  Weight loss: 28.3 lbs from initial consult; 8.3 lbs from day of surgery    Current Vitamins/Minerals: Multivitamin, vitamin D, B12 and B-complex.   -Pt states that she is tolerating all of her vitamins even he vitamin D and B12 which are not chewable    Nutrition History  Pt reports consuming and tolerating bariatric clear and low-fat full liquid diets. Fluid intake appears adequate, consuming 48-64 oz/day.  Liquids: premier protein, water with protein.   Pt states that she has been having some baby food fruits.    Nutrition Prescription:  Grams Protein: 60 (minimum)  Amount of Fluid: 48-64 oz    Nutrition Diagnosis  Food and nutrition-related knowledge deficit r/t lack of prior exposure to diet advancements beyond bariatric low-fat full liquid diet aeb pt unable to verbalize understanding of bariatric pureed and soft diets.    Intervention  Intervention At Appointment:  Materials/education provided on bariatric pureed and soft diets, protein intake, fluid intake, eating pace, portion control, avoiding excess sugar and fat, recommended vitamin/mineral supplements.  -Encouraged patient to follow the diet guidelines to help aid in maximum weight loss.   -Answered all questions at this time.   -Gave AVS and RD contact information.    Patient Understanding:  Good  Expected Compliance: Good    Goals:  1) Follow bariatric low-fat full liquid diet through day 13 post-op, then to progress to pureed diet x 2 weeks.  If tolerating, may advance on day 29 post-op to bariatric soft diet.   2) Work towards 60 gm protein/day.  3) Consume 48-64 oz fluids daily- between meals.  4) Eat slowly (>20 min/meal), chewing well to smooth consistency once on the bariatric soft diet.  5) Limit portions to 1/2 cup/meal.  6) Start chewable/liquid multivitamin/minerals twice daily.  7) Bring in your vitamin/supplement     Follow-Up: 3 weeks or prn    Time spent with patient: 15 minutes.  Lesley Carranza, MS, RD, LD

## 2019-07-02 ENCOUNTER — OFFICE VISIT (OUTPATIENT)
Dept: SURGERY | Facility: CLINIC | Age: 29
End: 2019-07-02
Payer: COMMERCIAL

## 2019-07-02 VITALS
DIASTOLIC BLOOD PRESSURE: 80 MMHG | RESPIRATION RATE: 16 BRPM | BODY MASS INDEX: 38.59 KG/M2 | SYSTOLIC BLOOD PRESSURE: 134 MMHG | HEART RATE: 83 BPM | TEMPERATURE: 98.1 F | HEIGHT: 61 IN | OXYGEN SATURATION: 98 % | WEIGHT: 204.4 LBS

## 2019-07-02 DIAGNOSIS — Z98.84 BARIATRIC SURGERY STATUS: Primary | ICD-10-CM

## 2019-07-02 RX ORDER — NICOTINE POLACRILEX 4 MG/1
20 GUM, CHEWING ORAL DAILY
COMMUNITY
End: 2019-07-02

## 2019-07-02 ASSESSMENT — PAIN SCALES - GENERAL: PAINLEVEL: NO PAIN (0)

## 2019-07-02 ASSESSMENT — MIFFLIN-ST. JEOR: SCORE: 1601.14

## 2019-07-02 NOTE — NURSING NOTE
"(   Chief Complaint   Patient presents with     Surgical Followup     5 weeks S/P LSG 5/28/19 with Dr. Escoto.    )    ( Weight: 92.7 kg (204 lb 6.4 oz) )  ( Height: 156 cm (5' 1.42\") )  ( BMI (Calculated): 38.1 )  ( Initial Weight: 107 kg (236 lb) )  ( Cumulative weight loss (lbs): 31.6 )  ( Last Visits Weight: 94.2 kg (207 lb 11.2 oz) )  ( Wt change since last visit (lbs): -3.3 )  (   )  (   )    ( BP: 134/80 )  (   )  ( Temp: 98.1  F (36.7  C) )  ( Temp src: Oral )  ( Pulse: 83 )  ( Resp: 16 )  ( SpO2: 98 % )    (   Patient Active Problem List   Diagnosis     Morbid (severe) obesity due to excess calories (H)    )  (   Current Outpatient Medications   Medication Sig Dispense Refill     acetaminophen (TYLENOL) 325 MG tablet Take 1-2 tablets (325-650 mg) by mouth every 6 hours as needed for mild pain 56 tablet 0     buPROPion (WELLBUTRIN XL) 300 MG 24 hr tablet Take 300 mg by mouth every morning       cetirizine (ZYRTEC) 10 MG tablet Take 10 mg by mouth daily       escitalopram (LEXAPRO) 10 MG tablet Take 10 mg by mouth daily       Multiple Vitamin (MULTI-VITAMIN DAILY) TABS Take 1 tablet by mouth daily        ursodiol (ACTIGALL) 300 MG capsule Take 1 capsule (300 mg) by mouth 2 times daily 60 capsule 4     vitamin D3 (CHOLECALCIFEROL) 2000 units tablet Take 2,000 Units by mouth daily       hyoscyamine (ANASPAZ/LEVSIN) 0.125 MG tablet Take 1 tablet (125 mcg) by mouth every 4 hours as needed for cramping (spasms) (Patient not taking: Reported on 7/2/2019) 28 tablet 0     melatonin 5 MG CAPS Take 1 tablet by mouth daily       Misc Natural Products (GLUCOSAMINE CHONDROITIN ADV PO) Take 1 tablet by mouth daily       ondansetron (ZOFRAN-ODT) 4 MG ODT tab Take 1 tablet (4 mg) by mouth every 6 hours as needed for nausea or vomiting (Patient not taking: Reported on 6/6/2019) 14 tablet 0     oxyCODONE (ROXICODONE) 5 MG tablet Take 1 tablet (5 mg) by mouth every 6 hours as needed for severe pain (Patient not taking: Reported " on 6/6/2019) 10 tablet 0     Phentermine-Topiramate 7.5-46 MG CP24 Take 1 capsule by mouth daily (Patient not taking: Reported on 7/2/2019) 30 capsule 3    )  ( Diabetes Eval:    )    ( Pain Eval:  No Pain (0) )    ( Wound Eval:       )    (   History   Smoking Status     Never Smoker   Smokeless Tobacco     Never Used    )    ( Signed By:  Amanda Cervantes; July 2, 2019; 9:05 AM )

## 2019-07-02 NOTE — PROGRESS NOTES
"Postoperative bariatric surgery visit.    Patient underwent sleeve gastrectomy 5 weeks ago.  5/28/19 Dr. Escoto     Tolerating liquids: yes, getting adequate   Lightheadedness: none  Abdominal pain: none  Bowel movements: occasional constipation, talked about decreasing iron as no history of iron deficiency  Fevers/shakes/chills: none  GERD: occasional Taking omeprazole once daily now, didn't immediately after surgery but has restarted   Leg/calf pain:  none    Wt Readings from Last 5 Encounters:   07/02/19 92.7 kg (204 lb 6.4 oz)   06/06/19 94.2 kg (207 lb 11.2 oz)   05/28/19 98 kg (216 lb 0.8 oz)   05/13/19 101.2 kg (223 lb)   04/18/19 101 kg (222 lb 9.6 oz)        /80 (BP Location: Left arm, Patient Position: Sitting, Cuff Size: Adult Large)   Pulse 83   Temp 98.1  F (36.7  C) (Oral)   Resp 16   Ht 1.56 m (5' 1.42\")   Wt 92.7 kg (204 lb 6.4 oz)   SpO2 98%   BMI 38.10 kg/m    NAD  Overall looks well  Incisions c/d/i; soft, non-tender    Plan:  1. RD visit today.  2. Start vitamin supplements per RD directions.  3. Advance diet per RD directions.  4. Follow-up:1 months - insurance expiring end of the month- will need to do labs incase she doesn't have insurance moving forward- looking for job to provide insurance   5. Actigall prescription sent first visit   6. B12 SL   7. Pathology normal   8. Weight loss medications held qsymia post op, no issues since     ARIAN Pisano CNP   "

## 2019-07-02 NOTE — PATIENT INSTRUCTIONS
Goals:  1) Follow soft diet for 4 weeks, then to progress to bariatric regular diet.   2) Consume 60 grams of protein/day.  3) Sip on 48-64 oz of fluids/day- between meals only. Track your water intake.  4) Eat slowly (>20 min/meal), chewing foods well (to applesauce-like consistency).  5) Limit portions to 1/2 cup/meal.  6) Take the following supplements -Take the following after a Sleeve Gastrectomy:    Multivitamin/minerals: adult dose 2 times daily    Iron: 45-60 mg elemental (18-36 mg if low risk) - may partly or fully be covered in multivitamin     Calcium Citrate containing vitamin D: 500 mg 2 times daily or 600 mg 1-2 times daily. Chewable for the next two months.     Vitamin B12: sublingual form of at least 500 mcg daily or injection of 1000 mcg monthly     B-50 Complex once daily    Follow up with RD in 2 months     Lesley Carranza, MS, RD, LD  If you need to schedule or reschedule with a dietitian please call [phone].

## 2019-07-02 NOTE — LETTER
"7/2/2019       RE: Myra Yang  [address]  [place] [address]     Dear Colleague,    Thank you for referring your patient, Myra Yang, to the [facility] SURGICAL WEIGHT MANAGEMENT at [facility]. Please see a copy of my visit note below.    Postoperative bariatric surgery visit.    Patient underwent sleeve gastrectomy 5 weeks ago.  5/28/19 Dr. Escoto     Tolerating liquids: yes, getting adequate   Lightheadedness: none  Abdominal pain: none  Bowel movements: occasional constipation, talked about decreasing iron as no history of iron deficiency  Fevers/shakes/chills: none  GERD: occasional Taking omeprazole once daily now, didn't immediately after surgery but has restarted   Leg/calf pain:  none    Wt Readings from Last 5 Encounters:   07/02/19 92.7 kg (204 lb 6.4 oz)   06/06/19 94.2 kg (207 lb 11.2 oz)   05/28/19 98 kg (216 lb 0.8 oz)   05/13/19 101.2 kg (223 lb)   04/18/19 101 kg (222 lb 9.6 oz)        /80 (BP Location: Left arm, Patient Position: Sitting, Cuff Size: Adult Large)   Pulse 83   Temp 98.1  F (36.7  C) (Oral)   Resp 16   Ht 1.56 m (5' 1.42\")   Wt 92.7 kg (204 lb 6.4 oz)   SpO2 98%   BMI 38.10 kg/m     NAD  Overall looks well  Incisions c/d/i; soft, non-tender    Plan:  1. RD visit today.  2. Start vitamin supplements per RD directions.  3. Advance diet per RD directions.  4. Follow-up:1 months - insurance expiring end of the month- will need to do labs incase she doesn't have insurance moving forward- looking for job to provide insurance   5. Actigall prescription sent first visit   6. B12 SL   7. Pathology normal   8. Weight loss medications held qsymia post op, no issues since     ARIAN Pisano CNP     "

## 2019-07-02 NOTE — LETTER
7/2/2019       RE: Myra Yang  [address]  [place] [address]     Dear Colleague,    Thank you for referring your patient, Myra Yang, to the [facility] SURGICAL WEIGHT MANAGEMENT at [facility]. Please see a copy of my visit note below.    Nutrition Assessment  Reason For Visit:  Myra Yang is a 29 year old female presenting today for nutrition follow-up, 1 month s/p sleeve gastrectomy with Dr. Escoto (5/28/19). Pt referred by Anamaria Michael NP (6/6/19).     Anthropometrics  Initial Consult Weight: 236 lbs  Day of Surgery Weight: 216 lbs  Current Weight: 204.4 lbs  Weight loss: -31.6 lbs from initial consult; -11.6 lbs from day of surgery    Current Vitamins/Minerals: Multivitamin, vitamin D, B12 and B-complex.     Nutrition History  Pt reports that she is drinking 2-3 protein shakes a day, she is using muscle milk light (100 kcal and 20 grams of protein). She is using the protein shakes as a meal replacement. Other beverages include water, occasional [employer] coffee drink and protein supplement at night. When she does eat solid foods she states that it is under a half a cup. She does note that she has had some treats over the past month especially at a work conference.     She is trying to get 10,000 steps daily and plans to start light weight training when her weight restrictions are lifted.      Progress Towards Previous Goal:  1) Follow bariatric low-fat full liquid diet through day 13 post-op, then to progress to pureed diet x 2 weeks.  If tolerating, may advance on day 29 post-op to bariatric soft diet.-Continues, pt is doing mostly shakes.    2) Work towards 60 gm protein/day.-Met   3) Consume 48-64 oz fluids daily- between meals.-Continues, pt states that she believe she is drinking enough water but is not sure how much she is drinking. Encouraged patient to track her fluid intake.   4) Eat slowly (>20 min/meal),  chewing well to smooth consistency once on the bariatric soft diet.-Continues, she state that sometimes this is hard when she is hungry   5) Limit portions to 1/2 cup/meal.-Met   6) Start chewable/liquid multivitamin/minerals twice daily.-Met   7) Bring in your vitamin/supplement -Met     Nutrition Prescription:  Grams Protein: 60 (minimum)  Amount of Fluid: 48-64 oz    Nutrition Diagnosis  Previous:Food and nutrition-related knowledge deficit r/t lack of prior exposure to diet advancements beyond bariatric low-fat full liquid diet aeb pt unable to verbalize understanding of bariatric pureed and soft diets.    Current: Food and nutrition-related knowledge deficit r/t lack of prior exposure to diet advancements beyond bariatric pureed diet aeb pt unable to verbalize full understanding of bariatric soft and regular consistency diets.    Intervention  Materials/Education provided on bariatric soft and regular consistency diets, protein intake, fluid intake, eating pace, chewing foods well, portion control, sugar/fat intake, recommended vitamin/mineral supplements.   - Discussed eating more solid foods while on the soft diet. Recommended no more than 2 protein shakes per day.   - Discussed minimizing liquid calories and eliminating caffeine and eliminating the extra protein supplement if she is using the protein shakes.    -Gave AVS and RD contact information.    Patient Understanding: Good  Expected Compliance: Good    Goals:  1) Follow soft diet for 4 weeks, then to progress to bariatric regular diet.   2) Consume 60 grams of protein/day.  3) Sip on 48-64 oz of fluids/day- between meals only. Track your water intake.  4) Eat slowly (>20 min/meal), chewing foods well (to applesauce-like consistency).  5) Limit portions to 1/2 cup/meal.  6) Take the following supplements -Take the following after a Sleeve Gastrectomy:    Multivitamin/minerals: adult dose 2 times daily    Iron: 45-60 mg elemental (18-36 mg if low risk) -  may partly or fully be covered in multivitamin     Calcium Citrate containing vitamin D: 500 mg 2 times daily or 600 mg 1-2 times daily. Chewable for the next two months.     Vitamin B12: sublingual form of at least 500 mcg daily or injection of 1000 mcg monthly     B-50 Complex once daily    Follow-Up: 2 months or at the end of July     Time spent with patient: 30 minutes.  Lesley Carranza, MS, RD, LD

## 2019-07-02 NOTE — PATIENT INSTRUCTIONS
It was a pleasure meeting with you today.     Thank you for allowing us the privilege of caring for you. We hope we provided you with the excellent service you deserve.     Please let us know if there is anything else we can do for you so that we can be sure you are leaving completely satisfied with your care experience.      You saw ARIAN Pisano CNP  today.     Instructions per today's visit:   Follow up end of month - with labs  Omeprazole once a day         To schedule appointments with our team, please call [phone] option #1    Please call during clinic hours Monday through Friday 8:00a - 4:00p if you have questions or you can contact us via Webl[employer] at anytime.      Nurses: [phone]  Fax: [phone]  Surgery Scheduler: [phone]    Please call the hospital at [phone] to speak with our on call MDs if you have urgent needs after hours, during weekends, or holidays.    **Please note if you need to go to the Emergency Room for any reason in the first 90 days after surgery it is important to go to the [facility] ER on the [place] on [facility] off of the [address] off [address]      Lab results will be communicated through My Chart or letter (if My Chart not used). Please call the clinic if you have not received communication after 1 week or if you have any questions.?      Main follow-up parameters for all bariatric patients     Patients who have undergone Bariatric surgery:    1. Follow-up interval during the first year: ~1 week, 1 month, 3 month, 6 month,12 months.  Every 6 months until 5 years; and then annually thereafter.  The goal of follow-up sessions is to ensure that food intake behavior (choice of food and eating speed) and exercise/activity level are set appropriately.    2. Yearly lab tests ordered by our clinic.      3. The bariatric team should be aware and potentially evaluate all adverse gastrointestinal symptoms (dysphagia, abdominal pain, nausea, vomiting,  diarrhea, heartburn, reflux, etc), which can be a sign of complication.  The bariatric surgeons perform general surgery procedures in addition to bariatric surgery (laparoscopic cholecystectomy, etc.)    4. Inability to tolerate textured food (chicken, steak, fish, eggs, beans) is NOT normal and may need to be evaluated by endoscopy performed by the bariatric surgeon.

## 2019-07-02 NOTE — PROGRESS NOTES
Nutrition Assessment  Reason For Visit:  Myra Yang is a 29 year old female presenting today for nutrition follow-up, 1 month s/p sleeve gastrectomy with Dr. Escoto (5/28/19). Pt referred by Anamaria Michael NP (6/6/19).     Anthropometrics  Initial Consult Weight: 236 lbs  Day of Surgery Weight: 216 lbs  Current Weight: 204.4 lbs  Weight loss: -31.6 lbs from initial consult; -11.6 lbs from day of surgery    Current Vitamins/Minerals: Multivitamin, vitamin D, B12 and B-complex.     Nutrition History  Pt reports that she is drinking 2-3 protein shakes a day, she is using muscle milk light (100 kcal and 20 grams of protein). She is using the protein shakes as a meal replacement. Other beverages include water, occasional [employer] coffee drink and protein supplement at night. When she does eat solid foods she states that it is under a half a cup. She does note that she has had some treats over the past month especially at a work conference.     She is trying to get 10,000 steps daily and plans to start light weight training when her weight restrictions are lifted.      Progress Towards Previous Goal:  1) Follow bariatric low-fat full liquid diet through day 13 post-op, then to progress to pureed diet x 2 weeks.  If tolerating, may advance on day 29 post-op to bariatric soft diet.-Continues, pt is doing mostly shakes.    2) Work towards 60 gm protein/day.-Met   3) Consume 48-64 oz fluids daily- between meals.-Continues, pt states that she believe she is drinking enough water but is not sure how much she is drinking. Encouraged patient to track her fluid intake.   4) Eat slowly (>20 min/meal), chewing well to smooth consistency once on the bariatric soft diet.-Continues, she state that sometimes this is hard when she is hungry   5) Limit portions to 1/2 cup/meal.-Met   6) Start chewable/liquid multivitamin/minerals twice daily.-Met   7) Bring in your vitamin/supplement -Met     Nutrition Prescription:  Grams  Protein: 60 (minimum)  Amount of Fluid: 48-64 oz    Nutrition Diagnosis  Previous:Food and nutrition-related knowledge deficit r/t lack of prior exposure to diet advancements beyond bariatric low-fat full liquid diet aeb pt unable to verbalize understanding of bariatric pureed and soft diets.    Current: Food and nutrition-related knowledge deficit r/t lack of prior exposure to diet advancements beyond bariatric pureed diet aeb pt unable to verbalize full understanding of bariatric soft and regular consistency diets.    Intervention  Materials/Education provided on bariatric soft and regular consistency diets, protein intake, fluid intake, eating pace, chewing foods well, portion control, sugar/fat intake, recommended vitamin/mineral supplements.   - Discussed eating more solid foods while on the soft diet. Recommended no more than 2 protein shakes per day.   - Discussed minimizing liquid calories and eliminating caffeine and eliminating the extra protein supplement if she is using the protein shakes.    -Gave AVS and RD contact information.    Patient Understanding: Good  Expected Compliance: Good    Goals:  1) Follow soft diet for 4 weeks, then to progress to bariatric regular diet.   2) Consume 60 grams of protein/day.  3) Sip on 48-64 oz of fluids/day- between meals only. Track your water intake.  4) Eat slowly (>20 min/meal), chewing foods well (to applesauce-like consistency).  5) Limit portions to 1/2 cup/meal.  6) Take the following supplements -Take the following after a Sleeve Gastrectomy:    Multivitamin/minerals: adult dose 2 times daily    Iron: 45-60 mg elemental (18-36 mg if low risk) - may partly or fully be covered in multivitamin     Calcium Citrate containing vitamin D: 500 mg 2 times daily or 600 mg 1-2 times daily. Chewable for the next two months.     Vitamin B12: sublingual form of at least 500 mcg daily or injection of 1000 mcg monthly     B-50 Complex once daily    Follow-Up: 2 months or at  the end of July     Time spent with patient: 30 minutes.  Lesley Carranza, MS, RD, LD

## 2019-07-18 ENCOUNTER — OFFICE VISIT (OUTPATIENT)
Dept: SURGERY | Facility: CLINIC | Age: 29
End: 2019-07-18
Payer: COMMERCIAL

## 2019-07-18 VITALS
HEIGHT: 61 IN | WEIGHT: 200.4 LBS | HEART RATE: 64 BPM | SYSTOLIC BLOOD PRESSURE: 115 MMHG | BODY MASS INDEX: 37.84 KG/M2 | DIASTOLIC BLOOD PRESSURE: 76 MMHG | OXYGEN SATURATION: 97 %

## 2019-07-18 DIAGNOSIS — Z98.84 BARIATRIC SURGERY STATUS: Primary | ICD-10-CM

## 2019-07-18 DIAGNOSIS — Z98.84 BARIATRIC SURGERY STATUS: ICD-10-CM

## 2019-07-18 LAB
ALBUMIN SERPL-MCNC: 3.5 G/DL (ref 3.4–5)
ALP SERPL-CCNC: 71 U/L (ref 40–150)
ALT SERPL W P-5'-P-CCNC: 17 U/L (ref 0–50)
ANION GAP SERPL CALCULATED.3IONS-SCNC: 3 MMOL/L (ref 3–14)
AST SERPL W P-5'-P-CCNC: 10 U/L (ref 0–45)
BILIRUB SERPL-MCNC: 0.4 MG/DL (ref 0.2–1.3)
BUN SERPL-MCNC: 14 MG/DL (ref 7–30)
CALCIUM SERPL-MCNC: 8.7 MG/DL (ref 8.5–10.1)
CHLORIDE SERPL-SCNC: 110 MMOL/L (ref 94–109)
CO2 SERPL-SCNC: 27 MMOL/L (ref 20–32)
CREAT SERPL-MCNC: 0.65 MG/DL (ref 0.52–1.04)
DEPRECATED CALCIDIOL+CALCIFEROL SERPL-MC: 51 UG/L (ref 20–75)
ERYTHROCYTE [DISTWIDTH] IN BLOOD BY AUTOMATED COUNT: 17.6 % (ref 10–15)
GFR SERPL CREATININE-BSD FRML MDRD: >90 ML/MIN/{1.73_M2}
GLUCOSE SERPL-MCNC: 87 MG/DL (ref 70–99)
HBA1C MFR BLD: 5.4 % (ref 0–5.6)
HCT VFR BLD AUTO: 44.2 % (ref 35–47)
HGB BLD-MCNC: 14.1 G/DL (ref 11.7–15.7)
MCH RBC QN AUTO: 25.4 PG (ref 26.5–33)
MCHC RBC AUTO-ENTMCNC: 31.9 G/DL (ref 31.5–36.5)
MCV RBC AUTO: 80 FL (ref 78–100)
PLATELET # BLD AUTO: 337 10E9/L (ref 150–450)
POTASSIUM SERPL-SCNC: 3.9 MMOL/L (ref 3.4–5.3)
PROT SERPL-MCNC: 6.8 G/DL (ref 6.8–8.8)
PTH-INTACT SERPL-MCNC: 57 PG/ML (ref 18–80)
RBC # BLD AUTO: 5.56 10E12/L (ref 3.8–5.2)
SODIUM SERPL-SCNC: 140 MMOL/L (ref 133–144)
VIT B12 SERPL-MCNC: 504 PG/ML (ref 193–986)
WBC # BLD AUTO: 8.3 10E9/L (ref 4–11)

## 2019-07-18 ASSESSMENT — MIFFLIN-ST. JEOR: SCORE: 1583

## 2019-07-18 ASSESSMENT — PAIN SCALES - GENERAL: PAINLEVEL: NO PAIN (0)

## 2019-07-18 NOTE — LETTER
"7/18/2019       RE: Myra Yang  [address]  [place] [address]     Dear Colleague,    Thank you for referring your patient, Myra Yang, to the [facility] Please see a copy of my visit note below.    Postoperative bariatric surgery visit.    Patient underwent sleeve gastrectomy 2 months ago.  Sleeve 5/28/19    Tolerating liquids: yes  Lightheadedness: none  Abdominal pain: none  Bowel movements: occasional, has decreased iron, possibly not getting enough water, not taking anything for this, difficult to pass  Fevers/shakes/chills: none  GERD: occasional, Not Taking omeprazole because she ran out, wiling to start again  Leg/calf pain: none    insurance expiring end of the month- will need to do labs incase she doesn't have insurance moving forward- looking for job to provide insurance     Wt Readings from Last 5 Encounters:   07/18/19 90.9 kg (200 lb 6.4 oz)   07/02/19 92.7 kg (204 lb 6.4 oz)   06/06/19 94.2 kg (207 lb 11.2 oz)   05/28/19 98 kg (216 lb 0.8 oz)   05/13/19 101.2 kg (223 lb)        /76 (BP Location: Left arm, Patient Position: Sitting, Cuff Size: Adult Large)   Pulse 64   Ht 1.56 m (5' 1.42\")   Wt 90.9 kg (200 lb 6.4 oz)   SpO2 97%   Breastfeeding? No   BMI 37.35 kg/m     NAD  Overall looks great  Incisions c/d/i; soft, non-tender    Plan:  1. RD visit today.  2. Start vitamin supplements per RD directions.  3. Advance diet per RD directions.  4. Follow-up: 1 month or as soon as able  5. Actigall prescription sent week 1, no issues   7. Pathology reviewed normal  8. Weight loss medications stopped post op    ARIAN Pisano CNP     "

## 2019-07-18 NOTE — NURSING NOTE
"(   Chief Complaint   Patient presents with     Surgical Followup     S/P LSG 5/28/19     )    ( Weight: 90.9 kg (200 lb 6.4 oz) )  ( Height: 156 cm (5' 1.42\") )  ( BMI (Calculated): 37.35 )  ( Initial Weight: 107 kg (236 lb) )  ( Cumulative weight loss (lbs): 35.6 )  ( Last Visits Weight: 92.7 kg (204 lb 6.4 oz) )  ( Wt change since last visit (lbs): -4 )  (   )  (   )    ( BP: 115/76 )  (   )  (   )  (   )  ( Pulse: 64 )  (   )  ( SpO2: 97 % )    (   Patient Active Problem List   Diagnosis     Morbid (severe) obesity due to excess calories (H)    )  (   Current Outpatient Medications   Medication Sig Dispense Refill     buPROPion (WELLBUTRIN XL) 300 MG 24 hr tablet Take 300 mg by mouth every morning       cetirizine (ZYRTEC) 10 MG tablet Take 10 mg by mouth daily       escitalopram (LEXAPRO) 10 MG tablet Take 10 mg by mouth daily       Misc Natural Products (GLUCOSAMINE CHONDROITIN ADV PO) Take 1 tablet by mouth daily       Multiple Vitamin (MULTI-VITAMIN DAILY) TABS Take 1 tablet by mouth daily        ursodiol (ACTIGALL) 300 MG capsule Take 1 capsule (300 mg) by mouth 2 times daily 60 capsule 4     vitamin D3 (CHOLECALCIFEROL) 2000 units tablet Take 2,000 Units by mouth daily       acetaminophen (TYLENOL) 325 MG tablet Take 1-2 tablets (325-650 mg) by mouth every 6 hours as needed for mild pain (Patient not taking: Reported on 7/18/2019) 56 tablet 0     melatonin 5 MG CAPS Take 1 tablet by mouth daily       omeprazole (PRILOSEC) 20 MG DR capsule Take 1 capsule (20 mg) by mouth daily (Patient not taking: Reported on 7/18/2019) 90 capsule 1    )  ( Diabetes Eval:    )    ( Pain Eval:  No Pain (0) )    ( Wound Eval:       )    (   History   Smoking Status     Never Smoker   Smokeless Tobacco     Never Used    )    ( Signed By:  Amanda Cervantes; July 18, 2019; 8:49 AM )    "

## 2019-07-18 NOTE — LETTER
7/18/2019     RE: Myra Yang  [address]  [place] [address]     Dear Colleague,    Thank you for referring your patient, Myra Yang, to the [facility] WEIGHT [facility]NA[facility]. Please see a copy of my visit note below.    Nutrition Assessment  Reason For Visit:  Myra Yang is a 29 year old female presenting today for nutrition follow-up, 1 month s/p sleeve gastrectomy with Dr. Escoto (5/28/19). Pt referred by Anamaria Michael NP (6/6/19).     Anthropometrics  Initial Consult Weight: 236 lbs  Day of Surgery Weight: 216 lbs  Current Weight: 200.4 lbs  Weight loss: -35.6 lbs from initial consult; -15.6 lbs from day of surgery    Current Vitamins/Minerals: Multivitamin, vitamin D, B12 and B-complex.   -Pt has not started her calcium yet, she reports that she has some at home.     Nutrition History  Pt reports that she is typically doing mostly protein shakes. She reports having 2-3 protein shakes and one meal daily. For her protein shakes she will rotate through using premier protein, Atkins protein shake and Equate high performance.     She has starting to workout again, doing more weight lifting.      Progress Towards Previous Goal:  1) Follow soft diet for 4 weeks, then to progress to bariatric regular diet. -Met   2) Consume 60 grams of protein/day.-Met  3) Sip on 48-64 oz of fluids/day- between meals only. Track your water intake.-Not Met  4) Eat slowly (>20 min/meal), chewing foods well (to applesauce-like consistency).-Continues   5) Limit portions to 1/2 cup/meal.-Met  6) Take the following supplements -Take the following after a Sleeve Gastrectomy:    Multivitamin/minerals: adult dose 2 times daily-Met    Iron: 45-60 mg elemental (18-36 mg if low risk) - may partly or fully be covered in multivitamin -Met    Calcium Citrate containing vitamin D: 500 mg 2 times daily or 600 mg 1-2 times daily. Chewable  for the next two months. -Not Met    Vitamin B12: sublingual form of at least 500 mcg daily or injection of 1000 mcg monthly -Met    B-50 Complex once daily-Met    Nutrition Prescription:  Grams Protein: 60 (minimum)  Amount of Fluid: 48-64 oz    Nutrition Diagnosis  Previous:Food and nutrition-related knowledge deficit r/t lack of prior exposure to diet advancements beyond bariatric pureed diet aeb pt unable to verbalize full understanding of bariatric soft and regular consistency diets.    Current: Obesity r/t long history of self-monitoring deficit and excessive energy intake aeb BMI >30 kg/m2..    Intervention  Materials/Education: Reviewed bariatric soft and regular consistency diets, protein intake, fluid intake, eating pace, chewing foods well, portion control, sugar/fat intake, recommended vitamin/mineral supplements.   - Discussed eating more solid foods while on the soft diet. Recommended no more than 2 protein shakes per day.   - Discussed minimizing liquid calories and eliminating caffeine and eliminating the extra protein supplement if she is using the protein shakes.    -Gave AVS and RD contact information.    Patient Understanding: Good  Expected Compliance: Good    Goals:  1) Follow soft diet for 4 weeks, then to progress to bariatric regular diet.   2) Consume 60 grams of protein/day.  3) Sip on 48-64 oz of fluids/day- between meals only. Track your water intake.  4) Eat slowly (>20 min/meal), chewing foods well (to applesauce-like consistency).  5) Limit portions to 1/2 cup/meal.  6) Take the following supplements -Take the following after a Sleeve Gastrectomy:    Multivitamin/minerals: adult dose 2 times daily    Iron: 45-60 mg elemental (18-36 mg if low risk) - may partly or fully be covered in multivitamin     Calcium Citrate containing vitamin D: 500 mg 2 times daily or 600 mg 1-2 times daily. Chewable for the next two months.     Vitamin B12: sublingual form of at least 500 mcg daily or  injection of 1000 mcg monthly     B-50 Complex once daily   Vitamin D     Stick to only 2 protein shakes per day and then 1 small meal     Follow-Up: As Able     Time spent with patient: 30 minutes.  Lesley Carranza, MS, RD, LD

## 2019-07-18 NOTE — PROGRESS NOTES
Nutrition Assessment  Reason For Visit:  Myra Yang is a 29 year old female presenting today for nutrition follow-up, 1 month s/p sleeve gastrectomy with Dr. Escoto (5/28/19). Pt referred by Anamaria Michael NP (6/6/19).     Anthropometrics  Initial Consult Weight: 236 lbs  Day of Surgery Weight: 216 lbs  Current Weight: 200.4 lbs  Weight loss: -35.6 lbs from initial consult; -15.6 lbs from day of surgery    Current Vitamins/Minerals: Multivitamin, vitamin D, B12 and B-complex.   -Pt has not started her calcium yet, she reports that she has some at home.     Nutrition History  Pt reports that she is typically doing mostly protein shakes. She reports having 2-3 protein shakes and one meal daily. For her protein shakes she will rotate through using premier protein, Atkins protein shake and Equate high performance.     She has starting to workout again, doing more weight lifting.      Progress Towards Previous Goal:  1) Follow soft diet for 4 weeks, then to progress to bariatric regular diet. -Met   2) Consume 60 grams of protein/day.-Met  3) Sip on 48-64 oz of fluids/day- between meals only. Track your water intake.-Not Met  4) Eat slowly (>20 min/meal), chewing foods well (to applesauce-like consistency).-Continues   5) Limit portions to 1/2 cup/meal.-Met  6) Take the following supplements -Take the following after a Sleeve Gastrectomy:    Multivitamin/minerals: adult dose 2 times daily-Met    Iron: 45-60 mg elemental (18-36 mg if low risk) - may partly or fully be covered in multivitamin -Met    Calcium Citrate containing vitamin D: 500 mg 2 times daily or 600 mg 1-2 times daily. Chewable for the next two months. -Not Met    Vitamin B12: sublingual form of at least 500 mcg daily or injection of 1000 mcg monthly -Met    B-50 Complex once daily-Met    Nutrition Prescription:  Grams Protein: 60 (minimum)  Amount of Fluid: 48-64 oz    Nutrition Diagnosis  Previous:Food and nutrition-related knowledge deficit  r/t lack of prior exposure to diet advancements beyond bariatric pureed diet aeb pt unable to verbalize full understanding of bariatric soft and regular consistency diets.    Current: Obesity r/t long history of self-monitoring deficit and excessive energy intake aeb BMI >30 kg/m2..    Intervention  Materials/Education: Reviewed bariatric soft and regular consistency diets, protein intake, fluid intake, eating pace, chewing foods well, portion control, sugar/fat intake, recommended vitamin/mineral supplements.   - Discussed eating more solid foods while on the soft diet. Recommended no more than 2 protein shakes per day.   - Discussed minimizing liquid calories and eliminating caffeine and eliminating the extra protein supplement if she is using the protein shakes.    -Gave AVS and RD contact information.    Patient Understanding: Good  Expected Compliance: Good    Goals:  1) Follow soft diet for 4 weeks, then to progress to bariatric regular diet.   2) Consume 60 grams of protein/day.  3) Sip on 48-64 oz of fluids/day- between meals only. Track your water intake.  4) Eat slowly (>20 min/meal), chewing foods well (to applesauce-like consistency).  5) Limit portions to 1/2 cup/meal.  6) Take the following supplements -Take the following after a Sleeve Gastrectomy:    Multivitamin/minerals: adult dose 2 times daily    Iron: 45-60 mg elemental (18-36 mg if low risk) - may partly or fully be covered in multivitamin     Calcium Citrate containing vitamin D: 500 mg 2 times daily or 600 mg 1-2 times daily. Chewable for the next two months.     Vitamin B12: sublingual form of at least 500 mcg daily or injection of 1000 mcg monthly     B-50 Complex once daily   Vitamin D     Stick to only 2 protein shakes per day and then 1 small meal     Follow-Up: As Able     Time spent with patient: 30 minutes.  Lesley Carranza, MS, RD, LD

## 2019-07-18 NOTE — PATIENT INSTRUCTIONS
Goals:  1) Follow soft diet for 4 weeks, then to progress to bariatric regular diet.   2) Consume 60 grams of protein/day.  3) Sip on 48-64 oz of fluids/day- between meals only. Track your water intake.  4) Eat slowly (>20 min/meal), chewing foods well (to applesauce-like consistency).  5) Limit portions to 1/2 cup/meal.  6) Take the following supplements -Take the following after a Sleeve Gastrectomy:    Multivitamin/minerals: adult dose 2 times daily    Iron: 45-60 mg elemental (18-36 mg if low risk) - may partly or fully be covered in multivitamin     Calcium Citrate containing vitamin D: 500 mg 2 times daily or 600 mg 1-2 times daily. Chewable for the next two months.     Vitamin B12: sublingual form of at least 500 mcg daily or injection of 1000 mcg monthly     B-50 Complex once daily   Vitamin D     Stick to only 2 protein shakes per day and then 1 small meal     Follow up with RD in as able.     Lesley Carranza, MS, RD, LD  If you need to schedule or reschedule with a dietitian please call [phone].

## 2019-07-18 NOTE — PATIENT INSTRUCTIONS
It was a pleasure meeting with you today.     Thank you for allowing us the privilege of caring for you. We hope we provided you with the excellent service you deserve.     Please let us know if there is anything else we can do for you so that we can be sure you are leaving completely satisfied with your care experience.      You saw ARIAN Pisano CNP today.     Instructions per today's visit:     Restart omeprazole  Continue actigall through 6 months pots op  Increase water intake  Try miralax over the counter, 1 capful a day as needed for constipation  Follow up as able, ideally we would have you follow up 3 months post op, 6 months post op,and 1 year post op    To schedule appointments with our team, please call [phone] option #1    Please call during clinic hours Monday through Friday 8:00a - 4:00p if you have questions or you can contact us via [employer] at anytime.      Nurses: [phone]  Fax: [phone]  Surgery Scheduler: [phone]    Please call the hospital at [phone] to speak with our on call MDs if you have urgent needs after hours, during weekends, or holidays.    *Please note if you need to go to the Emergency Room for any reason in the first 90 days after surgery it is important to go to the [facility] ER on the [place] on [address] [address] off of the [place] [address]    Lab results will be communicated through My Chart or letter (if My Chart not used). Please call the clinic if you have not received communication after 1 week or if you have any questions.?  Main follow-up parameters for all bariatric patients     Patients who have undergone Bariatric surgery:    1. Follow-up interval during the first year: ~1 week, 1 month, 3 month, 6 month,12 months.  Every 6 months until 5 years; and then annually thereafter.  The goal of follow-up sessions is to ensure that food intake behavior (choice of food and eating speed) and exercise/activity level are set  appropriately.    2. Yearly lab tests ordered by our clinic.      3. The bariatric team should be aware and potentially evaluate all adverse gastrointestinal symptoms (dysphagia, abdominal pain, nausea, vomiting, diarrhea, heartburn, reflux, etc), which can be a sign of complication.  The bariatric surgeons perform general surgery procedures in addition to bariatric surgery (laparoscopic cholecystectomy, etc.)    4. Inability to tolerate textured food (chicken, steak, fish, eggs, beans) is NOT normal and may need to be evaluated by endoscopy performed by the bariatric surgeon.

## 2019-07-18 NOTE — PROGRESS NOTES
"Postoperative bariatric surgery visit.    Patient underwent sleeve gastrectomy 2 months ago.  Sleeve 5/28/19    Tolerating liquids: yes  Lightheadedness: none  Abdominal pain: none  Bowel movements: occasional, has decreased iron, possibly not getting enough water, not taking anything for this, difficult to pass  Fevers/shakes/chills: none  GERD: occasional, Not Taking omeprazole because she ran out, wiling to start again  Leg/calf pain: none    insurance expiring end of the month- will need to do labs incase she doesn't have insurance moving forward- looking for job to provide insurance     Wt Readings from Last 5 Encounters:   07/18/19 90.9 kg (200 lb 6.4 oz)   07/02/19 92.7 kg (204 lb 6.4 oz)   06/06/19 94.2 kg (207 lb 11.2 oz)   05/28/19 98 kg (216 lb 0.8 oz)   05/13/19 101.2 kg (223 lb)        /76 (BP Location: Left arm, Patient Position: Sitting, Cuff Size: Adult Large)   Pulse 64   Ht 1.56 m (5' 1.42\")   Wt 90.9 kg (200 lb 6.4 oz)   SpO2 97%   Breastfeeding? No   BMI 37.35 kg/m    NAD  Overall looks great  Incisions c/d/i; soft, non-tender    Plan:  1. RD visit today.  2. Start vitamin supplements per RD directions.  3. Advance diet per RD directions.  4. Follow-up: 1 month or as soon as able  5. Actigall prescription sent week 1, no issues   7. Pathology reviewed normal  8. Weight loss medications stopped post op      ARIAN Pisano CNP   "

## 2019-07-21 LAB
ANNOTATION COMMENT IMP: NORMAL
RETINYL PALMITATE SERPL-MCNC: 0.05 MG/L (ref 0–0.1)
VIT A SERPL-MCNC: 0.39 MG/L (ref 0.3–1.2)

## 2020-03-11 ENCOUNTER — HEALTH MAINTENANCE LETTER (OUTPATIENT)
Age: 30
End: 2020-03-11

## 2021-01-03 ENCOUNTER — HEALTH MAINTENANCE LETTER (OUTPATIENT)
Age: 31
End: 2021-01-03

## 2021-04-25 ENCOUNTER — HEALTH MAINTENANCE LETTER (OUTPATIENT)
Age: 31
End: 2021-04-25

## 2021-10-10 ENCOUNTER — HEALTH MAINTENANCE LETTER (OUTPATIENT)
Age: 31
End: 2021-10-10

## 2022-05-21 ENCOUNTER — HEALTH MAINTENANCE LETTER (OUTPATIENT)
Age: 32
End: 2022-05-21

## 2022-09-18 ENCOUNTER — HEALTH MAINTENANCE LETTER (OUTPATIENT)
Age: 32
End: 2022-09-18

## 2023-06-04 ENCOUNTER — HEALTH MAINTENANCE LETTER (OUTPATIENT)
Age: 33
End: 2023-06-04

## (undated) DEVICE — LINEN TOWEL PACK X30 5481

## (undated) DEVICE — CLIP APPLIER ENDO 5MM M/L LIGAMAX EL5ML

## (undated) DEVICE — Device

## (undated) DEVICE — STPL SKIN 35W ROTATING HEAD PRW35

## (undated) DEVICE — NDL INSUFFLATION 13GA 150MM C2202

## (undated) DEVICE — SYSTEM CALIBRATION GASTRECTOMY SLEEVE VISIGI 3D 40FR 5240

## (undated) DEVICE — ENDO POUCH UNIVERSAL RETRIEVAL SYSTEM INZII 12/15MM CD004

## (undated) DEVICE — ESU GROUND PAD ADULT W/CORD E7507

## (undated) DEVICE — GLOVE PROTEXIS POWDER FREE SMT 7.5  2D72PT75X

## (undated) DEVICE — ENDO TROCAR SLEEVE KII Z-THREADED 05X100MM CTS02

## (undated) DEVICE — NDL SPINAL 22GA 7" QUINCKE 405149

## (undated) DEVICE — PREP CHLORAPREP 26ML TINTED ORANGE  260815

## (undated) DEVICE — COVER CAMERA IN-LIGHT DISP LT-C02

## (undated) DEVICE — ENDO TROCAR OPTICAL ACCESS KII Z-THRD 15X100MM C0R37

## (undated) DEVICE — DRSG PRIMAPORE 02X3" 7133

## (undated) DEVICE — ENDO TROCAR FIRST ENTRY KII FIOS Z-THRD 05X100MM CTF03

## (undated) DEVICE — ESU LIGASURE MARYLAND VESSEL LAP 44CM XLONG LF1944

## (undated) DEVICE — LINEN TOWEL PACK X6 WHITE 5487

## (undated) DEVICE — STPL POWERED ECHELON LONG 60MM PLEE60A

## (undated) DEVICE — STPL RELOAD REG TISSUE ECHELON 60 X 3.6MM BLUE GST60B

## (undated) DEVICE — NDL SPINAL 22GA 3.5" QUINCKE 405181

## (undated) DEVICE — LIGHT HANDLE X1 31140133

## (undated) DEVICE — SYR 50ML LL W/O NDL 309653

## (undated) RX ORDER — HYDROMORPHONE HYDROCHLORIDE 1 MG/ML
INJECTION, SOLUTION INTRAMUSCULAR; INTRAVENOUS; SUBCUTANEOUS
Status: DISPENSED
Start: 2019-05-28

## (undated) RX ORDER — SODIUM CHLORIDE, SODIUM LACTATE, POTASSIUM CHLORIDE, CALCIUM CHLORIDE 600; 310; 30; 20 MG/100ML; MG/100ML; MG/100ML; MG/100ML
INJECTION, SOLUTION INTRAVENOUS
Status: DISPENSED
Start: 2019-05-28

## (undated) RX ORDER — FENTANYL CITRATE 50 UG/ML
INJECTION, SOLUTION INTRAMUSCULAR; INTRAVENOUS
Status: DISPENSED
Start: 2019-05-28

## (undated) RX ORDER — ONDANSETRON 2 MG/ML
INJECTION INTRAMUSCULAR; INTRAVENOUS
Status: DISPENSED
Start: 2019-05-28

## (undated) RX ORDER — CEFAZOLIN SODIUM 2 G/100ML
INJECTION, SOLUTION INTRAVENOUS
Status: DISPENSED
Start: 2019-05-28